# Patient Record
Sex: FEMALE | Race: ASIAN | Employment: FULL TIME | URBAN - METROPOLITAN AREA
[De-identification: names, ages, dates, MRNs, and addresses within clinical notes are randomized per-mention and may not be internally consistent; named-entity substitution may affect disease eponyms.]

---

## 2017-01-31 ENCOUNTER — OFFICE VISIT (OUTPATIENT)
Dept: FAMILY MEDICINE | Facility: CLINIC | Age: 37
End: 2017-01-31

## 2017-01-31 VITALS
TEMPERATURE: 98.4 F | HEIGHT: 63 IN | SYSTOLIC BLOOD PRESSURE: 130 MMHG | BODY MASS INDEX: 34.41 KG/M2 | WEIGHT: 194.2 LBS | RESPIRATION RATE: 20 BRPM | DIASTOLIC BLOOD PRESSURE: 79 MMHG | OXYGEN SATURATION: 96 % | HEART RATE: 70 BPM

## 2017-01-31 DIAGNOSIS — Z23 IMMUNIZATION DUE: ICD-10-CM

## 2017-01-31 DIAGNOSIS — Z30.018 ENCOUNTER FOR INITIAL PRESCRIPTION OF OTHER CONTRACEPTIVES: ICD-10-CM

## 2017-01-31 DIAGNOSIS — B37.31 YEAST INFECTION OF THE VAGINA: Primary | ICD-10-CM

## 2017-01-31 DIAGNOSIS — N89.8 VAGINAL ITCHING: ICD-10-CM

## 2017-01-31 RX ORDER — NORGESTIMATE AND ETHINYL ESTRADIOL 0.25-0.035
1 KIT ORAL DAILY
Qty: 84 TABLET | Refills: 3 | Status: SHIPPED | OUTPATIENT
Start: 2017-01-31 | End: 2018-03-26

## 2017-01-31 RX ORDER — FLUCONAZOLE 150 MG/1
150 TABLET ORAL ONCE
Qty: 1 TABLET | Refills: 1 | Status: SHIPPED | OUTPATIENT
Start: 2017-01-31 | End: 2017-01-31

## 2017-01-31 NOTE — NURSING NOTE
1/31/2017 PCS Previsit Plan     DUE FOR:  Flu Shot - Declined   Tdap - Pending, would like to speak with MD.     LABS DUE:    JOHN La CMA

## 2017-01-31 NOTE — MR AVS SNAPSHOT
After Visit Summary   1/31/2017    Minnie Garg    MRN: [id number]           Patient Information     Date Of Birth          1980        Visit Information        Provider Department      1/31/2017 8:20 AM Amy Bhagat MD [facility]        Today's Diagnoses     Yeast infection of the vagina    -  1     Vaginal itching         Encounter for initial prescription of other contraceptives           Care Instructions    - You will want to start your birth control today  - Make sure to use condoms  -     Your medication list is printed, please keep this with you, it is helpful to bring this current list to any other medical appointments, the emergency room or hospital.    If you had lab testing today and your results are reassuring or normal they will be be mailed to you within 7 days.     If the lab tests need quick action we will call you with the results.  The phone number we will call with results is # [phone] (home) . If this is not the best number please call our clinic and change the number.    If you need any refills please call your pharmacy and they will contact us.    If you have any further concerns or wish to schedule another appointment you must call our office during normal business hours  [phone] (8-5:00 M-F)  If you have urgent medical questions that cannot wait  you may also call [phone] at any time of day.  If you have a medical emergency please call [phone].    Thank you for coming to [facility].    Etonogestrel Implant  What is this medicine?  ETONOGESTREL (et oh jose BETO trel) is a contraceptive (birth control) device. It is used to prevent pregnancy. It can be used for up to 3 years.  This medicine may be used for other purposes; ask your health care provider or pharmacist if you have questions.  What should I tell my health care provider before I take this medicine?  They need to know if you have any of these conditions:    abnormal  vaginal bleeding    blood vessel disease or blood clots    cancer of the breast, cervix, or liver    depression    diabetes    gallbladder disease    headaches    heart disease or recent heart attack    high blood pressure    high cholesterol    kidney disease    liver disease    renal disease    seizures    tobacco smoker    an unusual or allergic reaction to etonogestrel, other hormones, anesthetics or antiseptics, medicines, foods, dyes, or preservatives    pregnant or trying to get pregnant    breast-feeding  How should I use this medicine?  This device is inserted just under the skin on the inner side of your upper arm by a health care professional.  Talk to your pediatrician regarding the use of this medicine in children. Special care may be needed.  Overdosage: If you think you've taken too much of this medicine contact a poison control center or emergency room at once.  NOTE: This medicine is only for you. Do not share this medicine with others.  What if I miss a dose?  This does not apply.  What may interact with this medicine?  Do not take this medicine with any of the following medications:    amprenavir    bosentan    fosamprenavir  This medicine may also interact with the following medications:    barbiturate medicines for inducing sleep or treating seizures    certain medicines for fungal infections like ketoconazole and itraconazole    griseofulvin    medicines to treat seizures like carbamazepine, felbamate, oxcarbazepine, phenytoin, topiramate    modafinil    phenylbutazone    rifampin    some medicines to treat HIV infection like atazanavir, indinavir, lopinavir, nelfinavir, tipranavir, ritonavir    [place]'s wort  This list may not describe all possible interactions. Give your health care provider a list of all the medicines, herbs, non-prescription drugs, or dietary supplements you use. Also tell them if you smoke, drink alcohol, or use illegal drugs. Some items may interact with your  medicine.  What should I watch for while using this medicine?  This product does not protect you against HIV infection (AIDS) or other sexually transmitted diseases.  You should be able to feel the implant by pressing your fingertips over the skin where it was inserted. Contact your doctor if you cannot feel the implant, and use a non-hormonal birth control method (such as condoms) until your doctor confirms that the implant is in place. If you feel that the implant may have broken or become bent while in your arm, contact your healthcare provider.  What side effects may I notice from receiving this medicine?  Side effects that you should report to your doctor or health care professional as soon as possible:    allergic reactions like skin rash, itching or hives, swelling of the face, lips, or tongue    breast lumps    changes in emotions or moods    depressed mood    heavy or prolonged menstrual bleeding    pain, irritation, swelling, or bruising at the insertion site    scar at site of insertion    signs of infection at the insertion site such as fever, and skin redness, pain or discharge    signs of pregnancy    signs and symptoms of a blood clot such as breathing problems; changes in vision; chest pain; severe, sudden headache; pain, swelling, warmth in the leg; trouble speaking; sudden numbness or weakness of the face, arm or leg    signs and symptoms of liver injury like dark yellow or brown urine; general ill feeling or flu-like symptoms; light-colored stools; loss of appetite; nausea; right upper belly pain; unusually weak or tired; yellowing of the eyes or skin    unusual vaginal bleeding, discharge    signs and symptoms of a stroke like changes in vision; confusion; trouble speaking or understanding; severe headaches; sudden numbness or weakness of the face, arm or leg; trouble walking; dizziness; loss of balance or coordination  Side effects that usually do not require medical attention (Report these to  your doctor or health care professional if they continue or are bothersome.):    acne    back pain    breast pain    changes in weight    dizziness    general ill feeling or flu-like symptoms    headache    irregular menstrual bleeding    nausea    sore throat    vaginal irritation or inflammation  This list may not describe all possible side effects. Call your doctor for medical advice about side effects. You may report side effects to FDA at 9-581-HXX-3069.  Where should I keep my medicine?  This drug is given in a hospital or clinic and will not be stored at home.  NOTE: This sheet is a summary. It may not cover all possible information. If you have questions about this medicine, talk to your doctor, pharmacist, or health care provider.  NOTE:This sheet is a summary. It may not cover all possible information. If you have questions about this medicine, talk to your doctor, pharmacist, or health care provider. Copyright  2016 Gold Standard              Follow-ups after your visit        Who to contact     Please call your clinic at [phone] to:    Ask questions about your health    Make or cancel appointments    Discuss your medicines    Learn about your test results    Speak to your doctor   If you have compliments or concerns about an experience at your clinic, or if you wish to file a complaint, please contact [facility] Patient Relations at [phone] or email us at [email or web address]         Additional Information About Your Visit        [employer] Information     [employer] gives you secure access to your electronic health record. If you see a primary care provider, you can also send messages to your care team and make appointments. If you have questions, please call your primary care clinic.  If you do not have a primary care provider, please call [phone] and they will assist you.      [employer] is an electronic gateway that provides easy, online access to your  "medical records. With [employer], you can request a clinic appointment, read your test results, renew a prescription or communicate with your care team.     To access your existing account, please contact your [facility] or call [phone] for assistance.        Care EveryWhere ID     This is your Care EveryWhere ID. This could be used by other organizations to access your [facility] medical records  [id number]        Your Vitals Were     Pulse Temperature Respirations Height BMI (Body Mass Index) Pulse Oximetry    70 98.4  F (36.9  C) (Oral) 20 5' 2.5\" (158.8 cm) 34.93 kg/m2 96%    Last Period                   01/24/2017            Blood Pressure from Last 3 Encounters:   01/31/17 130/79   11/04/16 112/74   09/08/16 126/81    Weight from Last 3 Encounters:   01/31/17 194 lb 3.2 oz (88.089 kg)   11/04/16 197 lb 6.4 oz (89.54 kg)   09/08/16 198 lb 3.2 oz (89.903 kg)              Today, you had the following     No orders found for display         Today's Medication Changes          These changes are accurate as of: 1/31/17  8:40 AM.  If you have any questions, ask your nurse or doctor.               Start taking these medicines.        Dose/Directions    fluconazole 150 MG tablet   Commonly known as:  DIFLUCAN   Used for:  Yeast infection of the vagina, Vaginal itching   Started by:  Amy Bhagat MD        Dose:  150 mg   Take 1 tablet (150 mg) by mouth once for 1 dose   Quantity:  1 tablet   Refills:  1       norgestimate-ethinyl estradiol 0.25-35 MG-MCG per tablet   Commonly known as:  ORTHO-CYCLEN, SPRINTEC   Used for:  Encounter for initial prescription of other contraceptives   Started by:  Amy Bhagat MD        Dose:  1 tablet   Take 1 tablet by mouth daily   Quantity:  84 tablet   Refills:  3            Where to get your medicines      These medications were sent to [facility] - [place], [place] - [address]  [address], " [place] [address]     Phone:  [phone]    - fluconazole 150 MG tablet  - norgestimate-ethinyl estradiol 0.25-35 MG-MCG per tablet             Primary Care Provider Office Phone # Fax #    Makeda IBARRA DemianSvenHANS Dorado PADMINI [phone]       [facility] [address] [place] [address]        Thank you!     Thank you for choosing [facility]  for your care. Our goal is always to provide you with excellent care. Hearing back from our patients is one way we can continue to improve our services. Please take a few minutes to complete the written survey that you may receive in the mail after your visit with us. Thank you!             Your Updated Medication List - Protect others around you: Learn how to safely use, store and throw away your medicines at www.disposemymeds.org.          This list is accurate as of: 1/31/17  8:40 AM.  Always use your most recent med list.                   Brand Name Dispense Instructions for use    acetaminophen 325 MG tablet    TYLENOL     Take 650 mg by mouth       FLEXERIL 10 MG tablet   Generic drug:  cyclobenzaprine     14 tablet    Take 1 tablet (10 mg) by mouth nightly as needed (pain at HS)       fluconazole 150 MG tablet    DIFLUCAN    1 tablet    Take 1 tablet (150 mg) by mouth once for 1 dose       norgestimate-ethinyl estradiol 0.25-35 MG-MCG per tablet    ORTHO-CYCLEN, SPRINTEC    84 tablet    Take 1 tablet by mouth daily       PLAQUENIL 200 MG tablet   Generic drug:  hydroxychloroquine     60 tablet    Take 1 tablet (200 mg) by mouth 2 times daily

## 2017-01-31 NOTE — PATIENT INSTRUCTIONS
- You will want to start your birth control today  - Make sure to use condoms  -     Your medication list is printed, please keep this with you, it is helpful to bring this current list to any other medical appointments, the emergency room or hospital.    If you had lab testing today and your results are reassuring or normal they will be be mailed to you within 7 days.     If the lab tests need quick action we will call you with the results.  The phone number we will call with results is # [phone] (home) . If this is not the best number please call our clinic and change the number.    If you need any refills please call your pharmacy and they will contact us.    If you have any further concerns or wish to schedule another appointment you must call our office during normal business hours  [phone] (8-5:00 M-F)  If you have urgent medical questions that cannot wait  you may also call [phone] at any time of day.  If you have a medical emergency please call 911.    Thank you for coming to [facility].    Etonogestrel Implant  What is this medicine?  ETONOGESTREL (et oh jose BETO trel) is a contraceptive (birth control) device. It is used to prevent pregnancy. It can be used for up to 3 years.  This medicine may be used for other purposes; ask your health care provider or pharmacist if you have questions.  What should I tell my health care provider before I take this medicine?  They need to know if you have any of these conditions:    abnormal vaginal bleeding    blood vessel disease or blood clots    cancer of the breast, cervix, or liver    depression    diabetes    gallbladder disease    headaches    heart disease or recent heart attack    high blood pressure    high cholesterol    kidney disease    liver disease    renal disease    seizures    tobacco smoker    an unusual or allergic reaction to etonogestrel, other hormones, anesthetics or antiseptics, medicines, foods, dyes, or  preservatives    pregnant or trying to get pregnant    breast-feeding  How should I use this medicine?  This device is inserted just under the skin on the inner side of your upper arm by a health care professional.  Talk to your pediatrician regarding the use of this medicine in children. Special care may be needed.  Overdosage: If you think you've taken too much of this medicine contact a poison control center or emergency room at once.  NOTE: This medicine is only for you. Do not share this medicine with others.  What if I miss a dose?  This does not apply.  What may interact with this medicine?  Do not take this medicine with any of the following medications:    amprenavir    bosentan    fosamprenavir  This medicine may also interact with the following medications:    barbiturate medicines for inducing sleep or treating seizures    certain medicines for fungal infections like ketoconazole and itraconazole    griseofulvin    medicines to treat seizures like carbamazepine, felbamate, oxcarbazepine, phenytoin, topiramate    modafinil    phenylbutazone    rifampin    some medicines to treat HIV infection like atazanavir, indinavir, lopinavir, nelfinavir, tipranavir, ritonavir    [place]'s wort  This list may not describe all possible interactions. Give your health care provider a list of all the medicines, herbs, non-prescription drugs, or dietary supplements you use. Also tell them if you smoke, drink alcohol, or use illegal drugs. Some items may interact with your medicine.  What should I watch for while using this medicine?  This product does not protect you against HIV infection (AIDS) or other sexually transmitted diseases.  You should be able to feel the implant by pressing your fingertips over the skin where it was inserted. Contact your doctor if you cannot feel the implant, and use a non-hormonal birth control method (such as condoms) until your doctor confirms that the implant is in place. If you feel that  the implant may have broken or become bent while in your arm, contact your healthcare provider.  What side effects may I notice from receiving this medicine?  Side effects that you should report to your doctor or health care professional as soon as possible:    allergic reactions like skin rash, itching or hives, swelling of the face, lips, or tongue    breast lumps    changes in emotions or moods    depressed mood    heavy or prolonged menstrual bleeding    pain, irritation, swelling, or bruising at the insertion site    scar at site of insertion    signs of infection at the insertion site such as fever, and skin redness, pain or discharge    signs of pregnancy    signs and symptoms of a blood clot such as breathing problems; changes in vision; chest pain; severe, sudden headache; pain, swelling, warmth in the leg; trouble speaking; sudden numbness or weakness of the face, arm or leg    signs and symptoms of liver injury like dark yellow or brown urine; general ill feeling or flu-like symptoms; light-colored stools; loss of appetite; nausea; right upper belly pain; unusually weak or tired; yellowing of the eyes or skin    unusual vaginal bleeding, discharge    signs and symptoms of a stroke like changes in vision; confusion; trouble speaking or understanding; severe headaches; sudden numbness or weakness of the face, arm or leg; trouble walking; dizziness; loss of balance or coordination  Side effects that usually do not require medical attention (Report these to your doctor or health care professional if they continue or are bothersome.):    acne    back pain    breast pain    changes in weight    dizziness    general ill feeling or flu-like symptoms    headache    irregular menstrual bleeding    nausea    sore throat    vaginal irritation or inflammation  This list may not describe all possible side effects. Call your doctor for medical advice about side effects. You may report side effects to FDA at  1-800-FDA-1088.  Where should I keep my medicine?  This drug is given in a hospital or clinic and will not be stored at home.  NOTE: This sheet is a summary. It may not cover all possible information. If you have questions about this medicine, talk to your doctor, pharmacist, or health care provider.  NOTE:This sheet is a summary. It may not cover all possible information. If you have questions about this medicine, talk to your doctor, pharmacist, or health care provider. Copyright  2016 Gold Standard

## 2017-01-31 NOTE — PROGRESS NOTES
"       HPI:       Minnie Garg is a healthy 36 year old  female who presents to address the following concerns:    Desire for contraception:  Patient sexaully active with one male partner.  No hx contraceptive use.  No previous conception.  Would like OCPs to prevent pregnancy.  No bleeding or clotting disorders, no smoking, no family hx bleed/ing/clotting.      Current Lupus:   Pateint with historic dx Lupus  Controlled on [facility]  No concerns about control    Travel:  Traveling to [facility] and [place] for 2 weeks  Is hoping for rx Fluconazole for yeast infection if she gets one.     Social alcohol use.                PMHX:     Patient Active Problem List   Diagnosis     Systemic lupus erythematosus (H)     Anemia, unspecified type     Non morbid obesity due to excess calories     Pap smear for cervical cancer screening       Current Outpatient Prescriptions   Medication Sig Dispense Refill     cyclobenzaprine (FLEXERIL) 10 MG tablet Take 1 tablet (10 mg) by mouth nightly as needed (pain at HS) 14 tablet 0     hydroxychloroquine (PLAQUENIL) 200 MG tablet Take 1 tablet (200 mg) by mouth 2 times daily 60 tablet 1     acetaminophen (TYLENOL) 325 MG tablet Take 650 mg by mouth            Allergies   Allergen Reactions     No Known Allergies        No results found for this or any previous visit (from the past 24 hour(s)).    Current Outpatient Prescriptions   Medication     cyclobenzaprine (FLEXERIL) 10 MG tablet     hydroxychloroquine (PLAQUENIL) 200 MG tablet     acetaminophen (TYLENOL) 325 MG tablet     No current facility-administered medications for this visit.              Review of Systems:   ROS as described above.  Denies F/S/C/N/V/SOB/CP          Physical Exam:     Filed Vitals:    01/31/17 0814   BP: 130/79   Pulse: 70   Temp: 98.4  F (36.9  C)   TempSrc: Oral   Resp: 20   Height: 5' 2.5\" (158.8 cm)   Weight: 194 lb 3.2 oz (88.089 kg)   SpO2: 96% "     Body mass index is 34.93 kg/(m^2).    GEN: patient sitting comfortably in NAD  HEEN: Head is atraumatic, normocephalic, eyes anicteric, mucous membranes moist  CV: RRR w/o M/R/G  PULM: CTAB without w/r/r  ABD: soft, nontender, bowel sounds present  NEURO: Alert and oriented x3.  No focal motor abnormalities.  Face symmetric.  PSYCH: appropriate  SKIN: No rashes, bruising, or other lesions        Assessment and Plan     1. Yeast infection of the vagina  -refill for use on trip  - fluconazole (DIFLUCAN) 150 MG tablet; Take 1 tablet (150 mg) by mouth once for 1 dose  Dispense: 1 tablet; Refill: 1      2. Encounter for initial prescription of other contraceptives  -discussed efficacy of various contraceptive choices.  No interaction found OCP and plaquenil  -patient will consider Nexplanon for the future  - norgestimate-ethinyl estradiol (ORTHO-CYCLEN, SPRINTEC) 0.25-35 MG-MCG per tablet; Take 1 tablet by mouth daily  Dispense: 84 tablet; Refill: 3    3. Lupus: well controlled per patient report.  Not due for refill today of Plaquenil    Options for treatment and follow-up care were reviewed with the patient and/or guardian. Minnie Her and/or guardian engaged in the decision making process and verbalized understanding of the options discussed and agreed with the final plan.    4. HCM: Tdap today    Amy Bhagat MD

## 2017-03-24 ENCOUNTER — OFFICE VISIT (OUTPATIENT)
Dept: FAMILY MEDICINE | Facility: CLINIC | Age: 37
End: 2017-03-24

## 2017-03-24 VITALS
HEART RATE: 108 BPM | WEIGHT: 194 LBS | TEMPERATURE: 98.7 F | DIASTOLIC BLOOD PRESSURE: 74 MMHG | SYSTOLIC BLOOD PRESSURE: 116 MMHG | HEIGHT: 63 IN | OXYGEN SATURATION: 97 % | BODY MASS INDEX: 34.38 KG/M2

## 2017-03-24 DIAGNOSIS — J06.9 URI (UPPER RESPIRATORY INFECTION): ICD-10-CM

## 2017-03-24 DIAGNOSIS — R07.0 THROAT PAIN: Primary | ICD-10-CM

## 2017-03-24 LAB — S PYO AG THROAT QL IA.RAPID: NEGATIVE

## 2017-03-24 RX ORDER — IBUPROFEN 400 MG/1
400 TABLET, FILM COATED ORAL EVERY 6 HOURS PRN
Qty: 30 TABLET | Refills: 0 | Status: SHIPPED | OUTPATIENT
Start: 2017-03-24 | End: 2018-04-30

## 2017-03-24 NOTE — MR AVS SNAPSHOT
After Visit Summary   3/24/2017    Minnie Garg    MRN: [id number]           Patient Information     Date Of Birth          1980        Visit Information        Provider Department      3/24/2017 10:20 AM Makeda Santana APRN CNP [facility]        Today's Diagnoses     Throat pain    -  1    URI (upper respiratory infection)          Care Instructions      Viral Upper Respiratory Illness (Adult)  You have a viral upper respiratory illness (URI), which is another term for the common cold. This illness is contagious during the first few days. It is spread through the air by coughing and sneezing. It may also be spread by direct contact (touching the sick person and then touching your own eyes, nose, or mouth). Frequent handwashing will decrease risk of spread. Most viral illnesses go away within 7 to 10 days with rest and simple home remedies. Sometimes the illness may last for several weeks. Antibiotics will not kill a virus, and they are generally not prescribed for this condition.    Home care    If symptoms are severe, rest at home for the first 2 to 3 days. When you resume activity, don't let yourself get too tired.    Avoid being exposed to cigarette smoke (yours or others ).    You may use acetaminophen or ibuprofen to control pain and fever, unless another medicine was prescribed. (Note: If you have chronic liver or kidney disease, have ever had a stomach ulcer or gastrointestinal bleeding, or are taking blood-thinning medicines, talk with your healthcare provider before using these medicines.) Aspirin should never be given to anyone under 18 years of age who is ill with a viral infection or fever. It may cause severe liver or brain damage.    Your appetite may be poor, so a light diet is fine. Avoid dehydration by drinking 6 to 8 glasses of fluids per day (water, soft drinks, juices, tea, or soup). Extra fluids will help loosen secretions in the nose and  lungs.    Over-the-counter cold medicines will not shorten the length of time you re sick, but they may be helpful for the following symptoms: cough, sore throat, and nasal and sinus congestion. (Note: Do not use decongestants if you have high blood pressure.)  Follow-up care  Follow up with your healthcare provider, or as advised.  When to seek medical advice  Call your healthcare provider right away if any of these occur:    Cough with lots of colored sputum (mucus)    Severe headache; face, neck, or ear pain    Difficulty swallowing due to throat pain    Fever of 100.4 F (38 C)  Call 911, or get immediate medical care  Call emergency services right away if any of these occur:    Chest pain, shortness of breath, wheezing, or difficulty breathing    Coughing up blood    Inability to swallow due to throat pain    9635-6324 [employer]. [address] [place] [address]. All rights reserved. This information is not intended as a substitute for professional medical care. Always follow your healthcare professional's instructions.        When You Have a Sore Throat  A sore throat can be painful. There are many reasons why you may have a sore throat. Your healthcare provider will work with you to find the cause of your sore throat. He or she will also find the best treatment for you.      What Causes a Sore Throat?  Sore throats can be caused or worsened by:    Cold or flu viruses    Bacteria    Irritants such as tobacco smoke    Acid reflux  A Healthy Throat  The tonsils are on the sides of the throat near the base of the tongue. They collect viruses and bacteria and help fight infection. The throat (pharynx) is the passage for air. Mucus from the nasal cavity also moves down the passage.  An Inflamed Throat  The tonsils and pharynx can become inflamed due to a cold or flu virus. Postnasal drip (excess mucus draining from the nasal cavity) can irritate the throat. It can also make the throat or  tonsils more likely to be infected by bacteria. Severe, untreated tonsillitis in children or adults can cause a pocket of pus (abscess) to form near the tonsil.  Your Evaluation  A medical evaluation can help find the cause of your sore throat. It can also help your healthcare provider choose the best treatment for you. The evaluation may include a health history, physical exam, and diagnostic tests.  Health History  Your healthcare provider may ask you the following:    How long has the sore throat lasted and how have you been treating it?    Do you have any other symptoms, such as body aches, fever, or cough?    Does your sore throat recur? If so, how often? How many days of school or work have you missed because of a sore throat?    Do you have trouble eating or swallowing?    Have you been told that you snore or have other sleep problems?    Do you have bad breath?    Do you cough up bad-tasting mucus?  Physical Exam  During the exam, your healthcare provider checks your ears, nose, and throat for problems. He or she also checks for swelling in the neck, and may listen to your chest.  Possible Tests  Other tests your healthcare provider may perform include:    A throat swab to check for bacteria such as streptococcus (the bacteria that causes strep throat)    A blood test to check for mononucleosis (a viral infection)    A chest x-ray to rule out pneumonia, especially if you have a cough  Treating a Sore Throat  Treatment depends on many factors. What is the likely cause? Is the problem recent? Does it keep coming back? In many cases, the best thing to do is to treat the symptoms, rest, and let the problem heal itself. Antibiotics may help clear up some infections. For cases of severe or recurring tonsillitis, the tonsils may need to be removed.  Relieving Your Symptoms    Don t smoke, and avoid secondhand smoke.    For children, try throat sprays or Popsicles. Adults and older children may try  lozenges.    Drink warm liquids to soothe the throat and help thin mucus. Avoid alcohol, spicy foods, and acidic drinks such as orange juice. These can irritate the throat.    Gargle with warm saltwater (1 teaspoon of salt to 8 ounces of warm water).    Use a humidifier to keep air moist and relieve throat dryness.    Try over-the-counter pain relievers such as acetaminophen or ibuprofen. Use as directed, and don t exceed the recommended dose. Don t give aspirin to children.   Are Antibiotics Needed?  If your sore throat is due to a bacterial infection, antibiotics may speed healing and prevent complications. But most sore throats are caused by cold or flu viruses. And antibiotics don t treat viral illness. In fact, using antibiotics when they re not needed may produce bacteria that are harder to kill. Your healthcare provider will prescribe antibiotics only if he or she thinks they are likely to help.  If Antibiotics Are Prescribed  Take the medication exactly as directed. Be sure to finish your prescription even if you re feeling better.  And be sure to ask your healthcare provider or pharmacist what side effects are common and what to do about them.  Is Surgery Needed?  In some cases, tonsils need to be removed. This is often done as outpatient (same-day) surgery. Your healthcare provider may advise removing the tonsils in cases of:    Several severe bouts of tonsillitis in a year.  Severe  episodes include those that lead to missed days of school or work, or that need to be treated with antibiotics.    Tonsillitis that causes breathing problems during sleep.    Tonsillitis caused by food particles collecting in pouches in the tonsils (cryptic tonsillitis).  Call your healthcare provider if any of the following occur:    Symptoms worsen, or new symptoms develop.    Swollen tonsils make breathing difficult.    The pain is severe enough to keep you from drinking liquids.    A skin rash, hives, or wheezing develops.  Any of these could signal an allergic reaction to antibiotics.    Symptoms don t improve within a week.    Symptoms don t improve within 2-3 days of starting antibiotics.     1814-1086 [employer]. [address] [place] [address], [place] [address]. All rights reserved. This information is not intended as a substitute for professional medical care. Always follow your healthcare professional's instructions.              Follow-ups after your visit        Who to contact     Please call your clinic at [phone] to:    Ask questions about your health    Make or cancel appointments    Discuss your medicines    Learn about your test results    Speak to your doctor   If you have compliments or concerns about an experience at your clinic, or if you wish to file a complaint, please contact [facility] Patient Relations at [phone] or email us at [email or web address]         Additional Information About Your Visit        [employer] Information     [employer] gives you secure access to your electronic health record. If you see a primary care provider, you can also send messages to your care team and make appointments. If you have questions, please call your primary care clinic.  If you do not have a primary care provider, please call [phone] and they will assist you.      [employer] is an electronic gateway that provides easy, online access to your medical records. With [employer], you can request a clinic appointment, read your test results, renew a prescription or communicate with your care team.     To access your existing account, please contact your [facility] Clinic or call [phone] for assistance.        Care EveryWhere ID     This is your Care EveryWhere ID. This could be used by other organizations to access your [facility] medical records  [id number]        Your Vitals Were     Pulse Temperature Height Pulse Oximetry BMI (Body Mass Index)       108  "98.7  F (37.1  C) (Oral) 5' 2.75\" (159.4 cm) 97% 34.64 kg/m2        Blood Pressure from Last 3 Encounters:   03/24/17 116/74   01/31/17 130/79   11/04/16 112/74    Weight from Last 3 Encounters:   03/24/17 194 lb (88 kg)   01/31/17 194 lb 3.2 oz (88.1 kg)   11/04/16 197 lb 6.4 oz (89.5 kg)              We Performed the Following     Rapid Strep Screen (Group) ([facility])          Today's Medication Changes          These changes are accurate as of: 3/24/17 11:52 AM.  If you have any questions, ask your nurse or doctor.               Start taking these medicines.        Dose/Directions    ibuprofen 400 MG tablet   Commonly known as:  ADVIL/MOTRIN   Used for:  URI (upper respiratory infection)   Started by:  Makeda Santana APRN CNP        Dose:  400 mg   Take 1 tablet (400 mg) by mouth every 6 hours as needed for moderate pain   Quantity:  30 tablet   Refills:  0       lidocaine 2 % solution   Commonly known as:  XYLOCAINE   Used for:  Throat pain   Started by:  Makeda Santana APRN CNP        swish and spit 10 ml every 3 hours as needed max 8 doses/24 hour period   Quantity:  100 mL   Refills:  0            Where to get your medicines      These medications were sent to [facility] - [place], [place] - [address]  [address], [place] [address]     Phone:  [phone]     ibuprofen 400 MG tablet    lidocaine 2 % solution                Primary Care Provider Office Phone # Fax #    HANS Majano [phone]       [facility] [address] [place] [address]        Thank you!     Thank you for choosing [facility]  for your care. Our goal is always to provide you with excellent care. Hearing back from our patients is one way we can continue to improve our services. Please take a few minutes to complete the written survey that you may receive in the mail after your visit with us. Thank you!             Your Updated " Medication List - Protect others around you: Learn how to safely use, store and throw away your medicines at www.disposemymeds.org.          This list is accurate as of: 3/24/17 11:52 AM.  Always use your most recent med list.                   Brand Name Dispense Instructions for use    acetaminophen 325 MG tablet    TYLENOL     Take 650 mg by mouth       FLEXERIL 10 MG tablet   Generic drug:  cyclobenzaprine     14 tablet    Take 1 tablet (10 mg) by mouth nightly as needed (pain at HS)       ibuprofen 400 MG tablet    ADVIL/MOTRIN    30 tablet    Take 1 tablet (400 mg) by mouth every 6 hours as needed for moderate pain       lidocaine 2 % solution    XYLOCAINE    100 mL    swish and spit 10 ml every 3 hours as needed max 8 doses/24 hour period       norgestimate-ethinyl estradiol 0.25-35 MG-MCG per tablet    ORTHO-CYCLEN, SPRINTEC    84 tablet    Take 1 tablet by mouth daily       PLAQUENIL 200 MG tablet   Generic drug:  hydroxychloroquine     60 tablet    Take 1 tablet (200 mg) by mouth 2 times daily

## 2017-03-24 NOTE — PROGRESS NOTES
"  SUBJECTIVE: 36 year old female presents with 2 day hx sore throat with cough.  Began coughing last night, dry cough mostly and today has chills and sore throat.  No muscle aches, or fever measured.     Past Medical History:   Diagnosis Date     Lupus (H)      Sees [facility]umatologist, [facility]. Unable to get in prior to trip [place] in [place], which went well without any illness.    OBJECTIVE: /74 (BP Location: Right arm, Patient Position: Chair, Cuff Size: Adult Regular)  Pulse 108  Temp 98.7  F (37.1  C) (Oral)  Ht 5' 2.75\" (159.4 cm)  Wt 194 lb (88 kg)  SpO2 97%  BMI 34.64 kg/m2      ASSESSMENT: ***    PLAN: ***    "

## 2017-03-24 NOTE — PATIENT INSTRUCTIONS
Viral Upper Respiratory Illness (Adult)  You have a viral upper respiratory illness (URI), which is another term for the common cold. This illness is contagious during the first few days. It is spread through the air by coughing and sneezing. It may also be spread by direct contact (touching the sick person and then touching your own eyes, nose, or mouth). Frequent handwashing will decrease risk of spread. Most viral illnesses go away within 7 to 10 days with rest and simple home remedies. Sometimes the illness may last for several weeks. Antibiotics will not kill a virus, and they are generally not prescribed for this condition.    Home care    If symptoms are severe, rest at home for the first 2 to 3 days. When you resume activity, don't let yourself get too tired.    Avoid being exposed to cigarette smoke (yours or others ).    You may use acetaminophen or ibuprofen to control pain and fever, unless another medicine was prescribed. (Note: If you have chronic liver or kidney disease, have ever had a stomach ulcer or gastrointestinal bleeding, or are taking blood-thinning medicines, talk with your healthcare provider before using these medicines.) Aspirin should never be given to anyone under 18 years of age who is ill with a viral infection or fever. It may cause severe liver or brain damage.    Your appetite may be poor, so a light diet is fine. Avoid dehydration by drinking 6 to 8 glasses of fluids per day (water, soft drinks, juices, tea, or soup). Extra fluids will help loosen secretions in the nose and lungs.    Over-the-counter cold medicines will not shorten the length of time you re sick, but they may be helpful for the following symptoms: cough, sore throat, and nasal and sinus congestion. (Note: Do not use decongestants if you have high blood pressure.)  Follow-up care  Follow up with your healthcare provider, or as advised.  When to seek medical advice  Call your healthcare provider right away if any  of these occur:    Cough with lots of colored sputum (mucus)    Severe headache; face, neck, or ear pain    Difficulty swallowing due to throat pain    Fever of 100.4 F (38 C)  Call 911, or get immediate medical care  Call emergency services right away if any of these occur:    Chest pain, shortness of breath, wheezing, or difficulty breathing    Coughing up blood    Inability to swallow due to throat pain    7760-8272 [employer]. [address] [place] [address], [place] [id number]. All rights reserved. This information is not intended as a substitute for professional medical care. Always follow your healthcare professional's instructions.        When You Have a Sore Throat  A sore throat can be painful. There are many reasons why you may have a sore throat. Your healthcare provider will work with you to find the cause of your sore throat. He or she will also find the best treatment for you.      What Causes a Sore Throat?  Sore throats can be caused or worsened by:    Cold or flu viruses    Bacteria    Irritants such as tobacco smoke    Acid reflux  A Healthy Throat  The tonsils are on the sides of the throat near the base of the tongue. They collect viruses and bacteria and help fight infection. The throat (pharynx) is the passage for air. Mucus from the nasal cavity also moves down the passage.  An Inflamed Throat  The tonsils and pharynx can become inflamed due to a cold or flu virus. Postnasal drip (excess mucus draining from the nasal cavity) can irritate the throat. It can also make the throat or tonsils more likely to be infected by bacteria. Severe, untreated tonsillitis in children or adults can cause a pocket of pus (abscess) to form near the tonsil.  Your Evaluation  A medical evaluation can help find the cause of your sore throat. It can also help your healthcare provider choose the best treatment for you. The evaluation may include a health history, physical exam, and diagnostic tests.  Health  History  Your healthcare provider may ask you the following:    How long has the sore throat lasted and how have you been treating it?    Do you have any other symptoms, such as body aches, fever, or cough?    Does your sore throat recur? If so, how often? How many days of school or work have you missed because of a sore throat?    Do you have trouble eating or swallowing?    Have you been told that you snore or have other sleep problems?    Do you have bad breath?    Do you cough up bad-tasting mucus?  Physical Exam  During the exam, your healthcare provider checks your ears, nose, and throat for problems. He or she also checks for swelling in the neck, and may listen to your chest.  Possible Tests  Other tests your healthcare provider may perform include:    A throat swab to check for bacteria such as streptococcus (the bacteria that causes strep throat)    A blood test to check for mononucleosis (a viral infection)    A chest x-ray to rule out pneumonia, especially if you have a cough  Treating a Sore Throat  Treatment depends on many factors. What is the likely cause? Is the problem recent? Does it keep coming back? In many cases, the best thing to do is to treat the symptoms, rest, and let the problem heal itself. Antibiotics may help clear up some infections. For cases of severe or recurring tonsillitis, the tonsils may need to be removed.  Relieving Your Symptoms    Don t smoke, and avoid secondhand smoke.    For children, try throat sprays or Popsicles. Adults and older children may try lozenges.    Drink warm liquids to soothe the throat and help thin mucus. Avoid alcohol, spicy foods, and acidic drinks such as orange juice. These can irritate the throat.    Gargle with warm saltwater (1 teaspoon of salt to 8 ounces of warm water).    Use a humidifier to keep air moist and relieve throat dryness.    Try over-the-counter pain relievers such as acetaminophen or ibuprofen. Use as directed, and don t exceed the  recommended dose. Don t give aspirin to children.   Are Antibiotics Needed?  If your sore throat is due to a bacterial infection, antibiotics may speed healing and prevent complications. But most sore throats are caused by cold or flu viruses. And antibiotics don t treat viral illness. In fact, using antibiotics when they re not needed may produce bacteria that are harder to kill. Your healthcare provider will prescribe antibiotics only if he or she thinks they are likely to help.  If Antibiotics Are Prescribed  Take the medication exactly as directed. Be sure to finish your prescription even if you re feeling better.  And be sure to ask your healthcare provider or pharmacist what side effects are common and what to do about them.  Is Surgery Needed?  In some cases, tonsils need to be removed. This is often done as outpatient (same-day) surgery. Your healthcare provider may advise removing the tonsils in cases of:    Several severe bouts of tonsillitis in a year.  Severe  episodes include those that lead to missed days of school or work, or that need to be treated with antibiotics.    Tonsillitis that causes breathing problems during sleep.    Tonsillitis caused by food particles collecting in pouches in the tonsils (cryptic tonsillitis).  Call your healthcare provider if any of the following occur:    Symptoms worsen, or new symptoms develop.    Swollen tonsils make breathing difficult.    The pain is severe enough to keep you from drinking liquids.    A skin rash, hives, or wheezing develops. Any of these could signal an allergic reaction to antibiotics.    Symptoms don t improve within a week.    Symptoms don t improve within 2-3 days of starting antibiotics.     9064-5825 [employer]. [address] [place] [address], [place] [address]. All rights reserved. This information is not intended as a substitute for professional medical care. Always follow your healthcare professional's instructions.

## 2017-03-28 ENCOUNTER — OFFICE VISIT (OUTPATIENT)
Dept: FAMILY MEDICINE | Facility: CLINIC | Age: 37
End: 2017-03-28

## 2017-03-28 VITALS
TEMPERATURE: 98.9 F | BODY MASS INDEX: 34.45 KG/M2 | DIASTOLIC BLOOD PRESSURE: 80 MMHG | SYSTOLIC BLOOD PRESSURE: 137 MMHG | WEIGHT: 194.4 LBS | HEIGHT: 63 IN | OXYGEN SATURATION: 96 % | HEART RATE: 73 BPM

## 2017-03-28 DIAGNOSIS — B00.9: ICD-10-CM

## 2017-03-28 DIAGNOSIS — J03.80: ICD-10-CM

## 2017-03-28 DIAGNOSIS — B00.1 HERPES LABIALIS: Primary | ICD-10-CM

## 2017-03-28 RX ORDER — PREDNISONE 20 MG/1
20 TABLET ORAL DAILY
Qty: 2 TABLET | Refills: 0 | Status: SHIPPED | OUTPATIENT
Start: 2017-03-28 | End: 2017-05-02

## 2017-03-28 RX ORDER — VALACYCLOVIR HYDROCHLORIDE 1 G/1
2000 TABLET, FILM COATED ORAL 2 TIMES DAILY
Qty: 4 TABLET | Refills: 0 | Status: SHIPPED | OUTPATIENT
Start: 2017-03-28 | End: 2017-03-31

## 2017-03-28 NOTE — PROGRESS NOTES
"S:  This is a previously healthy female presenting for acute illness.  Reports dry itchy throat, cold sores, cough beena at night, neck feels \"weird\" and mom put tiger balm on her.  No fevers.  Maybe some chills over the weekend.  Throat continues to be sore.  Has tried theraflu, robitussin, nyquil, sucrets, lidocaine, ibuprofen.  None of which have helped.      Cough started Thursday of last week.      Patient Active Problem List   Diagnosis     Systemic lupus erythematosus (H)     Anemia, unspecified type     Non morbid obesity due to excess calories     Pap smear for cervical cancer screening     Current Outpatient Prescriptions   Medication     predniSONE (DELTASONE) 20 MG tablet     benzonatate (TESSALON) 200 MG capsule     valACYclovir (VALTREX) 1000 mg tablet     guaiFENesin-codeine (ROBITUSSIN AC) 100-10 MG/5ML SOLN solution     ibuprofen (ADVIL/MOTRIN) 400 MG tablet     lidocaine (XYLOCAINE) 2 % solution     norgestimate-ethinyl estradiol (ORTHO-CYCLEN, SPRINTEC) 0.25-35 MG-MCG per tablet     acetaminophen (TYLENOL) 325 MG tablet     cyclobenzaprine (FLEXERIL) 10 MG tablet     hydroxychloroquine (PLAQUENIL) 200 MG tablet     No current facility-administered medications for this visit.      Allergies   Allergen Reactions     No Known Allergies      O:  /80 (BP Location: Right arm, Patient Position: Chair, Cuff Size: Adult Regular)  Pulse 73  Temp 98.9  F (37.2  C) (Oral)  Ht 5' 2.75\" (159.4 cm)  Wt 194 lb 6.4 oz (88.2 kg)  SpO2 96%  BMI 34.71 kg/m2  GEN: mildly ill appearing  HEENT: mild pharyngeal erythema without exudate  CV: rrr w/o M/R/G  PULM: CTAB  ABD: non-tender  SKIN: no rash  PSYCH: appropriate    Results for orders placed or performed in visit on 03/24/17   Rapid Strep Screen (Group) ([facility])   Result Value Ref Range    Rapid Strep A Screen NEGATIVE Negative     A/P:  1. Herpes labialis-acute.  New since onset of sx.  Will initiate 1x tx Valtrex    2. Acute tonsillitis due to herpes " simplex virus (HSV)-possibly 2/2 activation herpes? Some erythema without exudate and neg rapid strep.  Clinically stable  - predniSONE (DELTASONE) 20 MG tablet; Take 1 tablet (20 mg) by mouth daily  Dispense: 2 tablet; Refill: 0  -ibuprofen for pain PRN      Return to clinic if not improved in the next 3-5 days.

## 2017-03-28 NOTE — MR AVS SNAPSHOT
"              After Visit Summary   3/28/2017    Minnie Garg    MRN: [id number]           Patient Information     Date Of Birth          1980        Visit Information        Provider Department      3/28/2017 4:40 PM Amy Bhagat MD [facility]        Today's Diagnoses     Herpes labialis    -  1    Acute tonsillitis due to herpes simplex virus (HSV)           Follow-ups after your visit        Who to contact     Please call your clinic at [phone] to:    Ask questions about your health    Make or cancel appointments    Discuss your medicines    Learn about your test results    Speak to your doctor   If you have compliments or concerns about an experience at your clinic, or if you wish to file a complaint, please contact [facility] Patient Relations at [phone] or email us at [email or web address]         Additional Information About Your Visit        MyChart Information     [employer] gives you secure access to your electronic health record. If you see a primary care provider, you can also send messages to your care team and make appointments. If you have questions, please call your primary care clinic.  If you do not have a primary care provider, please call [phone] and they will assist you.      [employer] is an electronic gateway that provides easy, online access to your medical records. With [employer], you can request a clinic appointment, read your test results, renew a prescription or communicate with your care team.     To access your existing account, please contact your [facility] or call [phone] for assistance.        Care EveryWhere ID     This is your Care EveryWhere ID. This could be used by other organizations to access your [place] medical records  [id number]        Your Vitals Were     Pulse Temperature Height Pulse Oximetry BMI (Body Mass Index)       73 98.9  F (37.2  C) (Oral) 5' 2.75\" " (159.4 cm) 96% 34.71 kg/m2        Blood Pressure from Last 3 Encounters:   03/31/17 119/77   03/28/17 137/80   03/24/17 116/74    Weight from Last 3 Encounters:   03/31/17 195 lb (88.5 kg)   03/28/17 194 lb 6.4 oz (88.2 kg)   03/24/17 194 lb (88 kg)              Today, you had the following     No orders found for display         Today's Medication Changes          These changes are accurate as of: 3/28/17 11:59 PM.  If you have any questions, ask your nurse or doctor.               Start taking these medicines.        Dose/Directions    predniSONE 20 MG tablet   Commonly known as:  DELTASONE   Used for:  Acute tonsillitis due to herpes simplex virus (HSV)   Started by:  Amy Bhagat MD        Dose:  20 mg   Take 1 tablet (20 mg) by mouth daily   Quantity:  2 tablet   Refills:  0       valACYclovir 1000 mg tablet   Commonly known as:  VALTREX   Used for:  Herpes labialis   Started by:  Amy Bhagat MD        Dose:  2000 mg   Take 2 tablets (2,000 mg) by mouth 2 times daily   Quantity:  4 tablet   Refills:  0            Where to get your medicines      These medications were sent to [facility] - [place], [place] - [address] [place] [address]  [address] [facility] [address], [place] [address]     Phone:  [phone]     predniSONE 20 MG tablet    valACYclovir 1000 mg tablet                Primary Care Provider Office Phone # Fax #    HANS Majano [facility] [phone]       [facility] [address] [place] [address]        Thank you!     Thank you for choosing [facility]  for your care. Our goal is always to provide you with excellent care. Hearing back from our patients is one way we can continue to improve our services. Please take a few minutes to complete the written survey that you may receive in the mail after your visit with us. Thank you!             Your Updated Medication List - Protect others around you: Learn how to safely use,  store and throw away your medicines at www.disposemymeds.org.          This list is accurate as of: 3/28/17 11:59 PM.  Always use your most recent med list.                   Brand Name Dispense Instructions for use    acetaminophen 325 MG tablet    TYLENOL     Take 650 mg by mouth       FLEXERIL 10 MG tablet   Generic drug:  cyclobenzaprine     14 tablet    Take 1 tablet (10 mg) by mouth nightly as needed (pain at HS)       ibuprofen 400 MG tablet    ADVIL/MOTRIN    30 tablet    Take 1 tablet (400 mg) by mouth every 6 hours as needed for moderate pain       lidocaine 2 % solution    XYLOCAINE    100 mL    swish and spit 10 ml every 3 hours as needed max 8 doses/24 hour period       norgestimate-ethinyl estradiol 0.25-35 MG-MCG per tablet    ORTHO-CYCLEN, SPRINTEC    84 tablet    Take 1 tablet by mouth daily       PLAQUENIL 200 MG tablet   Generic drug:  hydroxychloroquine     60 tablet    Take 1 tablet (200 mg) by mouth 2 times daily       predniSONE 20 MG tablet    DELTASONE    2 tablet    Take 1 tablet (20 mg) by mouth daily       valACYclovir 1000 mg tablet    VALTREX    4 tablet    Take 2 tablets (2,000 mg) by mouth 2 times daily

## 2017-03-28 NOTE — LETTER
RETURN TO WORK/SCHOOL FORM    3/28/2017    Re: Minnie Garg  1980      To Whom It May Concern:     Minnie Garg was seen in clinic 3/28/17 for acute illness.  Please excuse from work 3/27/17 and 3/28/17      Amy Bhagat MD  3/28/2017 5:16 PM

## 2017-03-31 ENCOUNTER — OFFICE VISIT (OUTPATIENT)
Dept: FAMILY MEDICINE | Facility: CLINIC | Age: 37
End: 2017-03-31

## 2017-03-31 VITALS
RESPIRATION RATE: 20 BRPM | TEMPERATURE: 98.2 F | WEIGHT: 195 LBS | HEIGHT: 63 IN | SYSTOLIC BLOOD PRESSURE: 119 MMHG | BODY MASS INDEX: 34.55 KG/M2 | OXYGEN SATURATION: 98 % | DIASTOLIC BLOOD PRESSURE: 77 MMHG | HEART RATE: 56 BPM

## 2017-03-31 DIAGNOSIS — R05.9 COUGH: Primary | ICD-10-CM

## 2017-03-31 DIAGNOSIS — B00.1 HERPES LABIALIS: ICD-10-CM

## 2017-03-31 RX ORDER — BENZONATATE 200 MG/1
200 CAPSULE ORAL 3 TIMES DAILY PRN
Qty: 60 CAPSULE | Refills: 0 | Status: SHIPPED | OUTPATIENT
Start: 2017-03-31 | End: 2018-03-26

## 2017-03-31 RX ORDER — VALACYCLOVIR HYDROCHLORIDE 1 G/1
2000 TABLET, FILM COATED ORAL 2 TIMES DAILY
Qty: 4 TABLET | Refills: 0 | Status: SHIPPED | OUTPATIENT
Start: 2017-03-31 | End: 2018-03-26

## 2017-03-31 RX ORDER — CODEINE PHOSPHATE AND GUAIFENESIN 10; 100 MG/5ML; MG/5ML
1 SOLUTION ORAL EVERY 4 HOURS PRN
Qty: 120 ML | Refills: 0 | Status: SHIPPED | OUTPATIENT
Start: 2017-03-31 | End: 2017-05-02

## 2017-03-31 NOTE — PROGRESS NOTES
HPI:   SUBJECTIVE:  Patient is here to follow up recent diagnosis of non-strep pharyngitis, outbreak of herpes labialis and cough.  Earlier this week was prescribed an oral course of valacyclovir for herpes labialis, was also given a short course of steroids for painful pharyngitis.  She reports that she took these medications and that herpes labialis is improved.  She reports that her symptoms of pharyngitis have also improved since taking 2 days of 20 mg prednisone daily.  This has completed her course.  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  She is wondering if there is something else that can be done for cough.  Denies fevers, sweats, chills, nausea, vomiting, diarrhea.  Denies new rashes.          PMHX:     Patient Active Problem List   Diagnosis     Systemic lupus erythematosus (H)     Anemia, unspecified type     Non morbid obesity due to excess calories     Pap smear for cervical cancer screening       Current Outpatient Prescriptions   Medication Sig Dispense Refill     benzonatate (TESSALON) 200 MG capsule Take 1 capsule (200 mg) by mouth 3 times daily as needed for cough 60 capsule 0     valACYclovir (VALTREX) 1000 mg tablet Take 2 tablets (2,000 mg) by mouth 2 times daily 4 tablet 0     guaiFENesin-codeine (ROBITUSSIN AC) 100-10 MG/5ML SOLN solution Take 5 mLs by mouth every 4 hours as needed for cough 120 mL 0     predniSONE (DELTASONE) 20 MG tablet Take 1 tablet (20 mg) by mouth daily 2 tablet 0     [DISCONTINUED] valACYclovir (VALTREX) 1000 mg tablet Take 2 tablets (2,000 mg) by mouth 2 times daily 4 tablet 0     ibuprofen (ADVIL/MOTRIN) 400 MG tablet Take 1 tablet (400 mg) by mouth every 6 hours as needed for moderate pain 30 tablet 0     lidocaine (XYLOCAINE) 2 % solution swish and spit 10 ml every 3 hours as needed max 8 doses/24 hour period 100 mL 0      "norgestimate-ethinyl estradiol (ORTHO-CYCLEN, SPRINTEC) 0.25-35 MG-MCG per tablet Take 1 tablet by mouth daily 84 tablet 3     acetaminophen (TYLENOL) 325 MG tablet Take 650 mg by mouth       cyclobenzaprine (FLEXERIL) 10 MG tablet Take 1 tablet (10 mg) by mouth nightly as needed (pain at HS) 14 tablet 0     hydroxychloroquine (PLAQUENIL) 200 MG tablet Take 1 tablet (200 mg) by mouth 2 times daily 60 tablet 1          Allergies   Allergen Reactions     No Known Allergies        No results found for this or any previous visit (from the past 24 hour(s)).    Current Outpatient Prescriptions   Medication     benzonatate (TESSALON) 200 MG capsule     valACYclovir (VALTREX) 1000 mg tablet     guaiFENesin-codeine (ROBITUSSIN AC) 100-10 MG/5ML SOLN solution     predniSONE (DELTASONE) 20 MG tablet     [DISCONTINUED] valACYclovir (VALTREX) 1000 mg tablet     ibuprofen (ADVIL/MOTRIN) 400 MG tablet     lidocaine (XYLOCAINE) 2 % solution     norgestimate-ethinyl estradiol (ORTHO-CYCLEN, SPRINTEC) 0.25-35 MG-MCG per tablet     acetaminophen (TYLENOL) 325 MG tablet     cyclobenzaprine (FLEXERIL) 10 MG tablet     hydroxychloroquine (PLAQUENIL) 200 MG tablet     No current facility-administered medications for this visit.               Review of Systems:   ROS as described above.  Denies F/S/C/N/V/SOB/CP          Physical Exam:     Vitals:    03/31/17 0914   BP: 119/77   Pulse: 56   Resp: 20   Temp: 98.2  F (36.8  C)   SpO2: 98%   Weight: 195 lb (88.5 kg)   Height: 5' 2.75\" (159.4 cm)     Body mass index is 34.82 kg/(m^2).  GEN: patient sitting comfortably in NAD  HEEN: Head is atraumatic, normocephalic, eyes anicteric, mucous membranes moist, mild pharyngeal erythema without exudate.  Normal voice, lips with resolving herpes labialis.  Now dried scab without new lesions  CV: RRR w/o M/R/G  PULM: CTAB without w/r/r.  No cough today in clinic  ABD: soft, nontender, bowel sounds present  NEURO: Alert and oriented x3.  No focal motor " abnormalities.  Face symmetric.  PSYCH: appropriate  SKIN: herpes labialis resolving    Results for orders placed or performed in visit on 03/24/17   Rapid Strep Screen (Group) ([facility])   Result Value Ref Range    Rapid Strep A Screen NEGATIVE Negative       Assessment and Plan     ASSESSMENT AND PLAN:  This is a 37-year-old female with the following issues:   1.  Herpes labialis improved with oral course of valacyclovir.  The patient has a few events coming up.  One is a trip and also a wedding.  She has requested another one-time treatment for herpes labialis should it return and I honored this request today.   2.  Pharyngitis improved with steroids likely viral in nature, symptoms and exam are improving today.  No need for further treatment.   3.  Cough, likely related to pharyngitis, again likely viral.  Discussed with patient that it may take a few weeks for this to improve.  We will treat today with symptomatic management, Tessalon and some nighttime cough syrup.  Patient is in agreement with this plan.  She will give a cough for a few more weeks to resolve on its own.  She is welcome to follow up with me in clinic if needed.  Discussed signs and symptoms to return to clinic, new fevers, shortness of breath with activity, increased work of breathing.  She is knowledgeable of these indications.     1. Cough  - benzonatate (TESSALON) 200 MG capsule; Take 1 capsule (200 mg) by mouth 3 times daily as needed for cough  Dispense: 60 capsule; Refill: 0  - guaiFENesin-codeine (ROBITUSSIN AC) 100-10 MG/5ML SOLN solution; Take 5 mLs by mouth every 4 hours as needed for cough  Dispense: 120 mL; Refill: 0    2. Herpes labialis  - valACYclovir (VALTREX) 1000 mg tablet; Take 2 tablets (2,000 mg) by mouth 2 times daily  Dispense: 4 tablet; Refill: 0    Options for treatment and follow-up care were reviewed with the patient and/or guardian. Minnie Her and/or guardian engaged in the decision making process and verbalized  understanding of the options discussed and agreed with the final plan.    Amy Bhagat MD

## 2017-03-31 NOTE — MR AVS SNAPSHOT
"              After Visit Summary   3/31/2017    Minnie Garg    MRN: [id number]           Patient Information     Date Of Birth          1980        Visit Information        Provider Department      3/31/2017 9:20 AM Amy Bhagat MD [facility]        Today's Diagnoses     Cough    -  1    Herpes labialis           Follow-ups after your visit        Who to contact     Please call your clinic at [phone] to:    Ask questions about your health    Make or cancel appointments    Discuss your medicines    Learn about your test results    Speak to your doctor   If you have compliments or concerns about an experience at your clinic, or if you wish to file a complaint, please contact [facility] Patient Relations at [phone] or email us at [email or web address]         Additional Information About Your Visit        MyChart Information     [employer] gives you secure access to your electronic health record. If you see a primary care provider, you can also send messages to your care team and make appointments. If you have questions, please call your primary care clinic.  If you do not have a primary care provider, please call [phone] and they will assist you.      [employer] is an electronic gateway that provides easy, online access to your medical records. With [employer], you can request a clinic appointment, read your test results, renew a prescription or communicate with your care team.     To access your existing account, please contact your [facility] or call [phone] for assistance.        Care EveryWhere ID     This is your Care EveryWhere ID. This could be used by other organizations to access your [place] medical records  [id number]        Your Vitals Were     Pulse Temperature Respirations Height Pulse Oximetry BMI (Body Mass Index)    56 98.2  F (36.8  C) 20 5' 2.75\" (159.4 cm) 98% 34.82 kg/m2       Blood " Pressure from Last 3 Encounters:   03/31/17 119/77   03/28/17 137/80   03/24/17 116/74    Weight from Last 3 Encounters:   03/31/17 195 lb (88.5 kg)   03/28/17 194 lb 6.4 oz (88.2 kg)   03/24/17 194 lb (88 kg)              Today, you had the following     No orders found for display         Today's Medication Changes          These changes are accurate as of: 3/31/17 11:59 PM.  If you have any questions, ask your nurse or doctor.               Start taking these medicines.        Dose/Directions    benzonatate 200 MG capsule   Commonly known as:  TESSALON   Used for:  Cough   Started by:  Amy Bhagat MD        Dose:  200 mg   Take 1 capsule (200 mg) by mouth 3 times daily as needed for cough   Quantity:  60 capsule   Refills:  0       guaiFENesin-codeine 100-10 MG/5ML Soln solution   Commonly known as:  ROBITUSSIN AC   Used for:  Cough   Started by:  Amy Bhagat MD        Dose:  1 tsp.   Take 5 mLs by mouth every 4 hours as needed for cough   Quantity:  120 mL   Refills:  0            Where to get your medicines      These medications were sent to [facility] - [place], [place] - [address] [place] [address] Pkw[address]  [address], [place] [address]     Phone:  [phone]     benzonatate 200 MG capsule    valACYclovir 1000 mg tablet         Some of these will need a paper prescription and others can be bought over the counter.  Ask your nurse if you have questions.     Bring a paper prescription for each of these medications     guaiFENesin-codeine 100-10 MG/5ML Soln solution                Primary Care Provider Office Phone # Fax #    HANS Majano [facility] [phone]       [facility] [address] [place] [address]        Thank you!     Thank you for choosing [facility]  for your care. Our goal is always to provide you with excellent care. Hearing back from our patients is one way we can continue to improve our services.  Please take a few minutes to complete the written survey that you may receive in the mail after your visit with us. Thank you!             Your Updated Medication List - Protect others around you: Learn how to safely use, store and throw away your medicines at www.disposemymeds.org.          This list is accurate as of: 3/31/17 11:59 PM.  Always use your most recent med list.                   Brand Name Dispense Instructions for use    acetaminophen 325 MG tablet    TYLENOL     Take 650 mg by mouth       benzonatate 200 MG capsule    TESSALON    60 capsule    Take 1 capsule (200 mg) by mouth 3 times daily as needed for cough       FLEXERIL 10 MG tablet   Generic drug:  cyclobenzaprine     14 tablet    Take 1 tablet (10 mg) by mouth nightly as needed (pain at HS)       guaiFENesin-codeine 100-10 MG/5ML Soln solution    ROBITUSSIN AC    120 mL    Take 5 mLs by mouth every 4 hours as needed for cough       ibuprofen 400 MG tablet    ADVIL/MOTRIN    30 tablet    Take 1 tablet (400 mg) by mouth every 6 hours as needed for moderate pain       lidocaine 2 % solution    XYLOCAINE    100 mL    swish and spit 10 ml every 3 hours as needed max 8 doses/24 hour period       norgestimate-ethinyl estradiol 0.25-35 MG-MCG per tablet    ORTHO-CYCLEN, SPRINTEC    84 tablet    Take 1 tablet by mouth daily       PLAQUENIL 200 MG tablet   Generic drug:  hydroxychloroquine     60 tablet    Take 1 tablet (200 mg) by mouth 2 times daily       predniSONE 20 MG tablet    DELTASONE    2 tablet    Take 1 tablet (20 mg) by mouth daily       valACYclovir 1000 mg tablet    VALTREX    4 tablet    Take 2 tablets (2,000 mg) by mouth 2 times daily

## 2017-04-01 NOTE — PROGRESS NOTES
"  SUBJECTIVE: 36 year old female presents with 2 day hx sore throat with cough.  Began coughing last night, dry cough mostly and today has chills and sore throat.  No muscle aches, or fever measured.     Past Medical History:   Diagnosis Date     Lupus (H)      Sees [facility]umatologist, [facility]. Unable to get in prior to trip abroad in February, which went well without any illness.    OBJECTIVE: /74 (BP Location: Right arm, Patient Position: Chair, Cuff Size: Adult Regular)  Pulse 108  Temp 98.7  F (37.1  C) (Oral)  Ht 5' 2.75\" (159.4 cm)  Wt 194 lb (88 kg)  SpO2 97%  BMI 34.64 kg/m2    Exam:  Constitutional: alert and moderate distress  Head: Normocephalic. No masses, lesions, tenderness or abnormalities  Neck: Neck supple. No adenopathy. Thyroid symmetric, normal size,  ENT: ENT exam normal, no neck nodes or sinus tenderness  Cardiovascular: negative  Respiratory: negative findings: normal respiratory rate and rhythm, lungs clear to auscultation, positive findings: cough: dry but harsh  Gastrointestinal: Abdomen soft, non-tender. BS normal. No masses, organomegaly      ASSESSMENT/PLAN:     (R07.0) Throat pain  (primary encounter diagnosis)  Comment: primary complaintComment: rapid strep negative, patient was notified. Will be phoned if her back up TC ifs positive for strep A and antibiotic therapy indicated. Symptoms mild-moderate and no lymphadenopathy accompanying.  Fluids rest and if needed acetaminophen or throat lozenges available OTC will be utilized. Will RTC if s/s worsen with above measures or if new s/s develop.  Plan: Rapid Strep Screen (Group) ([facility]), ibuprofen         (ADVIL/MOTRIN) 400 MG tablet, lidocaine         (XYLOCAINE) 2 % solution        Instructed in appropriate use of the swish and spit topical pain relief        Cautioned may be contagious despite negative RST.  Symptomatic care and RTC if s/s worsen or if new s/s " "develop.  (J06.9) URI (upper respiratory infection)  Comment: Symptomatic care, fluids and rest, primarily with \"watch and wait\" theme.  20 minutes face to face time was spent on counseling and care coordination for this visit, which was > 50% of the visit time.  Makeda Santana,APRN                       "

## 2017-05-02 DIAGNOSIS — J03.80: ICD-10-CM

## 2017-05-02 DIAGNOSIS — B00.9: ICD-10-CM

## 2017-05-02 DIAGNOSIS — R05.9 COUGH: ICD-10-CM

## 2017-05-02 NOTE — TELEPHONE ENCOUNTER
[facility] phone call message- patient requesting a refill:    Full Medication Name: Prednisone 20mg & guaiFENesin-codeine 100-10 MG/5ML     Dose:     Pharmacy confirmed as   [facility] - [place], [place] - [address]  [address]  [address]  [place] [address]  Phone: [phone] Fax: [phone]  : Yes    Additional Comments:  Patient states that it helped a little, went out of state and when she returned it still is lingering.     OK to leave a message on voice mail? Yes    Primary language: English      needed? No    Call taken on May 2, 2017 at 11:50 AM by Violeta Kennedy

## 2017-05-03 NOTE — TELEPHONE ENCOUNTER
C/o cough returned after coming back from trip. Cough x 4-5 days, somewhat productive- yellowish to green phlegm. No fever/ chills. No shortness of breath. Coughing increases when lay down at nighttime, making it difficult to sleep. Has tried Nyquil, Robitussin DM without much effect. Would like to request a refill of Prednisone and Robitussin AC, also given by Dr Bhagat over a month ago when was seen in clinic. Ben PATTERSON

## 2017-05-04 RX ORDER — PREDNISONE 20 MG/1
20 TABLET ORAL DAILY
Qty: 2 TABLET | Refills: 0 | Status: SHIPPED | OUTPATIENT
Start: 2017-05-04 | End: 2018-03-26

## 2017-05-04 RX ORDER — CODEINE PHOSPHATE AND GUAIFENESIN 10; 100 MG/5ML; MG/5ML
1 SOLUTION ORAL EVERY 4 HOURS PRN
Qty: 120 ML | Refills: 0 | Status: SHIPPED | OUTPATIENT
Start: 2017-05-04 | End: 2018-03-26

## 2017-05-04 NOTE — TELEPHONE ENCOUNTER
Patient is calling again and wanted to know if this could get approved today. She will be over in this area today. Please see if Makeda Santana can take a look at this and send it over to the pharmacy ASAP!

## 2017-05-04 NOTE — TELEPHONE ENCOUNTER
Patient called after hours.   Rx had not been received by [facility] per patient.   Note they are still pending in chart despite patient being told earlier today that they were filled by [facility].     Sent rx that were pending.   Note, strange to do just two days of prednisone.     Verified with [facility] that they did receive rx.     Wendy Mcallister MD

## 2018-03-26 ENCOUNTER — OFFICE VISIT (OUTPATIENT)
Dept: FAMILY MEDICINE | Facility: CLINIC | Age: 38
End: 2018-03-26
Payer: COMMERCIAL

## 2018-03-26 VITALS
SYSTOLIC BLOOD PRESSURE: 118 MMHG | TEMPERATURE: 97.9 F | WEIGHT: 202.4 LBS | OXYGEN SATURATION: 98 % | DIASTOLIC BLOOD PRESSURE: 83 MMHG | BODY MASS INDEX: 35.86 KG/M2 | HEIGHT: 63 IN | RESPIRATION RATE: 20 BRPM | HEART RATE: 72 BPM

## 2018-03-26 DIAGNOSIS — K11.20 PAROTITIS: Primary | ICD-10-CM

## 2018-03-26 NOTE — PROGRESS NOTES
Preceptor Attestation:  Patient's case reviewed and discussed with Jenifer Fernandes, DO Patient seen and discussed with the resident.. I agree with assessment and plan of care.  Supervising Physician:  Luis Carrasco MD  [facility]

## 2018-03-26 NOTE — MR AVS SNAPSHOT
After Visit Summary   3/26/2018    Minnie Garg    MRN: [id number]           Patient Information     Date Of Birth          1980        Visit Information        Provider Department      3/26/2018 10:40 AM Jenifer Fernandes,  [facility]        Today's Diagnoses     Parotitis    -  1       Follow-ups after your visit        Follow-up notes from your care team     Return in about 2 weeks (around 4/9/2018).      Your next 10 appointments already scheduled     Apr 13, 2018 10:00 AM CDT   Return Visit with Amy Bhagat MD   [facility] ([facility])    [address] [place] [id number]   [phone]              Who to contact     Please call your clinic at [phone] to:    Ask questions about your health    Make or cancel appointments    Discuss your medicines    Learn about your test results    Speak to your doctor            Additional Information About Your Visit        MyChart Information     [employer] gives you secure access to your electronic health record. If you see a primary care provider, you can also send messages to your care team and make appointments. If you have questions, please call your primary care clinic.  If you do not have a primary care provider, please call [phone] and they will assist you.      [employer] is an electronic gateway that provides easy, online access to your medical records. With [employer], you can request a clinic appointment, read your test results, renew a prescription or communicate with your care team.     To access your existing account, please contact your [facility] or call [phone] for assistance.        Care EveryWhere ID     This is your Care EveryWhere ID. This could be used by other organizations to access your [place] medical records  [id number]        Your Vitals Were     Pulse Temperature Respirations Height Last Period Pulse Oximetry    72 97.9  F (36.6  C)  "(Oral) 20 5' 2.5\" (158.8 cm) 02/26/2018 98%    BMI (Body Mass Index)                   36.43 kg/m2            Blood Pressure from Last 3 Encounters:   03/26/18 118/83   03/31/17 119/77   03/28/17 137/80    Weight from Last 3 Encounters:   03/26/18 202 lb 6.4 oz (91.8 kg)   03/31/17 195 lb (88.5 kg)   03/28/17 194 lb 6.4 oz (88.2 kg)              Today, you had the following     No orders found for display         Today's Medication Changes          These changes are accurate as of 3/26/18 11:59 PM.  If you have any questions, ask your nurse or doctor.               Start taking these medicines.        Dose/Directions    amoxicillin-clavulanate 875-125 MG per tablet   Commonly known as:  AUGMENTIN   Used for:  Parotitis   Started by:  Jenifer Fernandes,         Dose:  1 tablet   Take 1 tablet by mouth 2 times daily   Quantity:  28 tablet   Refills:  0            Where to get your medicines      These medications were sent to [facility] - [place], [place] - [address] [place], [place] [address]  [address] [facility] [address] [place] [address]     Phone:  [phone]     amoxicillin-clavulanate 875-125 MG per tablet                Primary Care Provider Office Phone # Fax #    Amy Bhagat [phone]       [facility] [address] [place] [address]        Equal Access to Services     TIMUR QUIROZ AH: Hadii nic garner hadasho Soomaali, waaxda luqadaha, qaybta kaalmada adeegyada, waxay eliana mcgraw adechaz damon. So [facility] [phone].    ATENCIÓN: Si habla español, tiene a weston disposición servicios gratuitos de asistencia lingüística. Llame al [phone].    We comply with applicable federal civil rights laws and [place] laws. We do not discriminate on the basis of race, color, national origin, age, disability, sex, sexual orientation, or gender identity.            Thank you!     Thank you for choosing [facility]  for your care. Our goal is always to provide you " with excellent care. Hearing back from our patients is one way we can continue to improve our services. Please take a few minutes to complete the written survey that you may receive in the mail after your visit with us. Thank you!             Your Updated Medication List - Protect others around you: Learn how to safely use, store and throw away your medicines at www.disposemymeds.org.          This list is accurate as of 3/26/18 11:59 PM.  Always use your most recent med list.                   Brand Name Dispense Instructions for use Diagnosis    acetaminophen 325 MG tablet    TYLENOL     Take 650 mg by mouth        amoxicillin-clavulanate 875-125 MG per tablet    AUGMENTIN    28 tablet    Take 1 tablet by mouth 2 times daily    Parotitis       FLEXERIL 10 MG tablet   Generic drug:  cyclobenzaprine     14 tablet    Take 1 tablet (10 mg) by mouth nightly as needed (pain at HS)        ibuprofen 400 MG tablet    ADVIL/MOTRIN    30 tablet    Take 1 tablet (400 mg) by mouth every 6 hours as needed for moderate pain    URI (upper respiratory infection), Throat pain       PLAQUENIL 200 MG tablet   Generic drug:  hydroxychloroquine     60 tablet    Take 200 mg by mouth daily as needed

## 2018-03-26 NOTE — PROGRESS NOTES
"[facility] - Family Medicine    Minnie Garg is a 38 year old  female with past medical history including systemic lupus erythematosus presenting for chief complaint of:     Patient presents with:  Pharyngitis: Took ibuprofen and theraflu did not help  Lymph Node: Swollen on left side noticed x1 day  Chills  Medication Reconciliation: Completed.       SUBJECTIVE:        HPI:  1. Sore throat, neck swelling:   Since yesterday patient has had a sore throat, runny nose and chills along with swelling and soreness of her left neck and face. She notes that she tends to get ill with similar symptoms this time of year between winter and spring. She has also similar swelling and pain over her left neck/face when she \"gets sick\" but it has never been this swollen. No increased pain or swelling with eating, nor any numbness, tingling of the left face, no facial droop. Has taken ibuprofen and Theraflu without improvement of symptoms. She denies any cough, dyspnea, fevers, tooth pain, dysphagia, odynophagia. Also denies any weight loss, fatigue, night sweats. Does have intermittent myalgias with her lupus. 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 Lives with a nephew but he is not ill, denies any other sick contacts.     History provided by patient    ROS:   CONSTITUTIONAL: Positive for chills, no fever, weight loss, fatigue, anorexia  HEENT: Positive for sore throat, runny nose. No ear pain  HEART: No chest pain, palpitations  LUNGS: No cough, wheezing, dyspnea  GI: No abdominal pain, nausea/vomiting  : No dysuria, other urinary symptoms   VASCULAR: No LE edema   NEURO: No dizziness, lightheadedness, numbness, tingling, facial droop  MSK: Intermittent myalgias, chronic   PSYCH: No mood changes   ENDOCRINE: No heat or cold intolerance   DERM: No rash or itching   HEME/LYMPH: Left neck/face swelling   Remainder of ROS negative unless otherwise mentioned above or in HPI    HISTORY:     Patient Active " "Problem List   Diagnosis     Systemic lupus erythematosus (H)     Anemia, unspecified type     Non morbid obesity due to excess calories     Pap smear for cervical cancer screening      Past Medical History:   Diagnosis Date     Lupus      Past Surgical History:   Procedure Laterality Date     NO HISTORY OF SURGERY        Allergies   Allergen Reactions     No Known Allergies      Current Outpatient Prescriptions   Medication     ibuprofen (ADVIL/MOTRIN) 400 MG tablet     acetaminophen (TYLENOL) 325 MG tablet     cyclobenzaprine (FLEXERIL) 10 MG tablet     hydroxychloroquine (PLAQUENIL) 200 MG tablet     predniSONE (DELTASONE) 20 MG tablet     guaiFENesin-codeine (ROBITUSSIN AC) 100-10 MG/5ML SOLN solution     benzonatate (TESSALON) 200 MG capsule     valACYclovir (VALTREX) 1000 mg tablet     lidocaine (XYLOCAINE) 2 % solution     norgestimate-ethinyl estradiol (ORTHO-CYCLEN, SPRINTEC) 0.25-35 MG-MCG per tablet     No current facility-administered medications for this visit.        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 In no acute distress   HEENT: Atraumatic, normocephalic. Moist mucous membranes. Posterior oropharynx clear, non-erythematous. No tonsillar swelling or exudates. Bilateral TMs clear without erythema, bulging or dullness. No frontal or maxillary sinus tenderness. Normal dentition with no dental caries visualized.   NECK/LYMPH: Large ~2x1 cm area of firmness over the left neck/face anterior to the left ramus of the mandible, extending to about the angle of the mandible. Area is non-tender, no crepitus, no fluctuance, no overlying erythema. Otherwise no palpable cervical, supraclavicular, or axillary lymphadenopathy.   HEART: RRR, normal S1 and S2, no m/r/g   LUNGS: No respiratory distress, lungs CTA "   GI: Normal bowel sounds. Soft, non-tender abdomen without rebound, guarding, masses, organomegaly.   : deferred  MSK: No gross deformities.   NEURO: Alert and oriented to person, place, time and events. Moves all extremities spontaneously. Normal gait. Answers questions appropriately.   DERM: No obvious rashes on exposed skin   PSYCH:  Pleasant, cooperative, normal affect     LABS:  n/a    IMAGING:  n/a     ASSESSMENT & PLAN:      Minnie Garg is a 38 year old  female with a PMHx of lupus who presented for a 1 day hx of sore throat and left neck/face swelling.     1. Parotitis  - amoxicillin-clavulanate (AUGMENTIN) 875-125 MG per tablet; Take 1 tablet by mouth 2 times daily  Dispense: 28 tablet; Refill: 0     Given acute onset and systemic symptoms, will treat with antibiotics.     Ddx includes but is not limited to:   -Parotid gland does not appear to be suppurative.   -Could be viral URI but diagnosis of exclusion  -Strep throat unlikely, Centor criteria of 2, deferring  -Exam findings not consistent with dental abscess, Lopez's angina, other infectious process.  -Lymphoma on the differential especially with history of SLE, but patient has not had B-symptom including night sweats, weight loss, itching.     Health Maintenance: up to date      RTC: follow up examination in 2 weeks     Options for treatment and follow-up care were reviewed with the patient and/or guardian. Minnie Garg and/or guardian engaged in the decision making process and verbalized understanding of the options discussed and agreed with the final plan.    Precepted today with: Dr. Luis rAias Disclosure:   I, Wing Pollack, am serving as a scribe; to document services personally performed by Dr. Jenifer Fernandes- -based on data collection and the provider's statements to me.     Provider Disclosure:  I agree with above History, Review of Systems, Physical exam and Plan.  I have reviewed the content of the documentation and have  edited it as needed. I have personally performed the services documented here and the documentation accurately represents those services and the decisions I have made.      Electronically signed by:  Jenifer Fernandes DO   [facility] Resident, PGY-3

## 2018-04-13 ENCOUNTER — OFFICE VISIT (OUTPATIENT)
Dept: FAMILY MEDICINE | Facility: CLINIC | Age: 38
End: 2018-04-13
Payer: COMMERCIAL

## 2018-04-13 VITALS
SYSTOLIC BLOOD PRESSURE: 113 MMHG | DIASTOLIC BLOOD PRESSURE: 77 MMHG | HEIGHT: 63 IN | TEMPERATURE: 98.3 F | OXYGEN SATURATION: 97 % | HEART RATE: 72 BPM | WEIGHT: 197.6 LBS | BODY MASS INDEX: 35.01 KG/M2

## 2018-04-13 DIAGNOSIS — Z87.19 H/O PAROTITIS: Primary | ICD-10-CM

## 2018-04-13 DIAGNOSIS — M32.9 SYSTEMIC LUPUS ERYTHEMATOSUS, UNSPECIFIED SLE TYPE, UNSPECIFIED ORGAN INVOLVEMENT STATUS (H): ICD-10-CM

## 2018-04-13 DIAGNOSIS — L85.3 XEROSIS CUTIS: ICD-10-CM

## 2018-04-13 NOTE — PATIENT INSTRUCTIONS
Things that can increase liklihood of a stone:  -Dehydration  -diuretics  -anticholinergic medications  -trauma

## 2018-04-13 NOTE — PROGRESS NOTES
HPI:       Minnie Garg is a 38 year old  female who presents to address the following concerns:    Parotid gland swelling  Patient seen on 3/26/2018 for parotid gland swelling diagnosed with parotitis.  She was treated with Augmentin of which she completed the course.  Reports that symptoms have resolved.  Also reports that she had a milder episode approximately 1 year ago but did not require antibiotics for that.  Denies pus or purulence associated with swelling.  Differential at that time included viral infection versus stone.  Patient with no continued symptoms of stone per her report.  Denies fever sweats chills pain with eating pain at the parotid gland.    Patient is reporting some dry hands today she works as a  with her sister.  She says that she is having troubles finding his way to have the hands stay hydrated.  Significant cracking irritation and dryness in the hands.  Looking for advice related to this.    Lupus  Patient reports that she has an appointment coming up with rheumatology.  A medication review shows that she should be taking Plaquenil daily.  She reports that she takes it intermittently at best.  Last visit with rheumatology was 2 years ago.  She denies active symptoms of lupus.    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  Denies illicits  Denies significant alcohol use.             PMHX:     Patient Active Problem List   Diagnosis     Systemic lupus erythematosus (H)     Anemia, unspecified type     Non morbid obesity due to excess calories     Pap smear for cervical cancer screening       Current Outpatient Prescriptions   Medication Sig Dispense Refill     cyclobenzaprine (FLEXERIL) 10 MG tablet Take 1 tablet (10 mg) by mouth nightly as needed (pain at HS) 14 tablet 0     hydroxychloroquine (PLAQUENIL) 200 MG tablet Take 200 mg by mouth daily as needed  60 tablet 1     ibuprofen (ADVIL/MOTRIN) 400 MG tablet Take 1 tablet (400 mg) by mouth every 6 hours as  "needed for moderate pain (Patient not taking: Reported on 4/13/2018) 30 tablet 0     acetaminophen (TYLENOL) 325 MG tablet Take 650 mg by mouth            Allergies   Allergen Reactions     No Known Allergies        No results found for this or any previous visit (from the past 24 hour(s)).    Current Outpatient Prescriptions   Medication     cyclobenzaprine (FLEXERIL) 10 MG tablet     hydroxychloroquine (PLAQUENIL) 200 MG tablet     ibuprofen (ADVIL/MOTRIN) 400 MG tablet     acetaminophen (TYLENOL) 325 MG tablet     No current facility-administered medications for this visit.               Review of Systems:   ROS as described above.  Denies F/S/C/N/V/SOB/CP          Physical Exam:     Vitals:    04/13/18 1003   BP: 113/77   Pulse: 72   Temp: 98.3  F (36.8  C)   TempSrc: Oral   SpO2: 97%   Weight: 197 lb 9.6 oz (89.6 kg)   Height: 5' 2.5\" (158.8 cm)     Body mass index is 35.57 kg/(m^2).    GEN: patient sitting comfortably in NAD.  HEEN: Head is atraumatic, normocephalic, eyes anicteric, mucous membranes moist.  Face symmetric.  Parotid gland normal by palpation.  No tenderness on examination  CV: RRR w/o M/R/G  PULM: CTAB without w/r/r  ABD: soft, nontender, bowel sounds present  NEURO: Alert and oriented x3.  No focal motor abnormalities.  Face symmetric.  PSYCH: appropriate  SKIN: diffusely dry hands with some cracking and skin thickening.     Imaging: none    Assessment and Plan     Recurrent parotitis  From history and chart review as well as patient input it appears that patient has had 2 episodes of parotid duct swelling in the last few years.  She reports that the most recent episode on 3/26/2018 was the most severe.  At that time treated for parotitis with with Augmentin.  Symptoms now resolved.  Patient completed course of Augmentin.  Discussed potential for evaluation of parotid duct stone/sludge stone formation.  Reviewed factors that can influence stone formation such as dehydration.  Discussed with " patient option of ENT.  We have agreed that if she has 1 additional episode of parotid gland swelling that she will have an EENT consultation.  Will hold off on consultation for now.  Encourage p.o. intake fluids.  I have reviewed her medications there is no medication that should promote this.  Next    History of lupus  Patient has a scheduled visit with rheumatology coming up.  She has not been taking her Plaquenil consistently.  Encouraged her to discuss with this with rheumatology but made no changes today.    Dry hands.  Exacerbated by dishwashing as job.  Patient's hands are extended excessively dry today.  Discussed lubrication options and have settled on Vaseline.  Should be applied multiple days.  Avoid triggers.  Consider gloves with dishwashing.    Patient will return if she experiences another episode of parotid gland swelling.  At that time I would recommend ENT referral for further evaluation.  She is in agreement with this plan.  Options for treatment and follow-up care were reviewed with the patient and/or guardian. Minnie Her and/or guardian engaged in the decision making process and verbalized understanding of the options discussed and agreed with the final plan.    Amy Bhagat MD

## 2018-04-13 NOTE — MR AVS SNAPSHOT
"              After Visit Summary   4/13/2018    Minnie Garg    MRN: [id number]           Patient Information     Date Of Birth          1980        Visit Information        Provider Department      4/13/2018 10:00 AM Amy Bhagat MD [facility]        Today's Diagnoses     H/O parotitis    -  1    Systemic lupus erythematosus, unspecified SLE type, unspecified organ involvement status (H)        Xerosis cutis          Care Instructions    Things that can increase liklihood of a stone:  -Dehydration  -diuretics  -anticholinergic medications  -trauma                 Follow-ups after your visit        Who to contact     Please call your clinic at [phone] to:    Ask questions about your health    Make or cancel appointments    Discuss your medicines    Learn about your test results    Speak to your doctor            Additional Information About Your Visit        [employer] Information     [employer] gives you secure access to your electronic health record. If you see a primary care provider, you can also send messages to your care team and make appointments. If you have questions, please call your primary care clinic.  If you do not have a primary care provider, please call [phone] and they will assist you.      [employer] is an electronic gateway that provides easy, online access to your medical records. With [employer], you can request a clinic appointment, read your test results, renew a prescription or communicate with your care team.     To access your existing account, please contact your [facility] or call [phone] for assistance.        Care EveryWhere ID     This is your Care EveryWhere ID. This could be used by other organizations to access your [place] medical records  [id number]        Your Vitals Were     Pulse Temperature Height Pulse Oximetry BMI (Body Mass Index)       72 98.3  F (36.8  C) (Oral) 5' 2.5\" (158.8 cm) 97% 35.57 kg/m2        " Blood Pressure from Last 3 Encounters:   04/13/18 113/77   03/26/18 118/83   03/31/17 119/77    Weight from Last 3 Encounters:   04/13/18 197 lb 9.6 oz (89.6 kg)   03/26/18 202 lb 6.4 oz (91.8 kg)   03/31/17 195 lb (88.5 kg)              Today, you had the following     No orders found for display         Today's Medication Changes          These changes are accurate as of 4/13/18 10:48 AM.  If you have any questions, ask your nurse or doctor.               Stop taking these medicines if you haven't already. Please contact your care team if you have questions.     FLEXERIL 10 MG tablet   Generic drug:  cyclobenzaprine   Stopped by:  Amy Bhagat MD                    Primary Care Provider Office Phone # Fax #    Amy Bhagat [phone]       [facility] [address] [place] [address]        Equal Access to Services     [facility] AH: Hadii aad ku hadasho Soomaali, waaxda luqadaha, qaybta kaalmada adeegyada, waxay idiin hayaan adeeg kharash la'gavinn . So [facility] [phone].    ATENCIÓN: Si habla español, tiene a weston disposición servicios gratuitos de asistencia lingüística. Llame al [phone].    We comply with applicable federal civil rights laws and [place] laws. We do not discriminate on the basis of race, color, national origin, age, disability, sex, sexual orientation, or gender identity.            Thank you!     Thank you for choosing [facility]  for your care. Our goal is always to provide you with excellent care. Hearing back from our patients is one way we can continue to improve our services. Please take a few minutes to complete the written survey that you may receive in the mail after your visit with us. Thank you!             Your Updated Medication List - Protect others around you: Learn how to safely use, store and throw away your medicines at www.disposemymeds.org.          This list is accurate as of 4/13/18 10:48 AM.  Always use  your most recent med list.                   Brand Name Dispense Instructions for use Diagnosis    acetaminophen 325 MG tablet    TYLENOL     Take 650 mg by mouth        ibuprofen 400 MG tablet    ADVIL/MOTRIN    30 tablet    Take 1 tablet (400 mg) by mouth every 6 hours as needed for moderate pain    URI (upper respiratory infection), Throat pain       PLAQUENIL 200 MG tablet   Generic drug:  hydroxychloroquine     60 tablet    Take 200 mg by mouth daily as needed

## 2018-04-30 ENCOUNTER — OFFICE VISIT (OUTPATIENT)
Dept: FAMILY MEDICINE | Facility: CLINIC | Age: 38
End: 2018-04-30
Payer: COMMERCIAL

## 2018-04-30 VITALS
DIASTOLIC BLOOD PRESSURE: 82 MMHG | BODY MASS INDEX: 36.07 KG/M2 | WEIGHT: 196 LBS | HEIGHT: 62 IN | HEART RATE: 100 BPM | RESPIRATION RATE: 18 BRPM | SYSTOLIC BLOOD PRESSURE: 120 MMHG | OXYGEN SATURATION: 96 % | TEMPERATURE: 98.7 F

## 2018-04-30 DIAGNOSIS — R60.9 PAROTID SWELLING: Primary | ICD-10-CM

## 2018-04-30 DIAGNOSIS — B00.1 HERPES LABIALIS: ICD-10-CM

## 2018-04-30 RX ORDER — VALACYCLOVIR HYDROCHLORIDE 1 G/1
2000 TABLET, FILM COATED ORAL 2 TIMES DAILY
Qty: 4 TABLET | Refills: 1 | Status: SHIPPED | OUTPATIENT
Start: 2018-04-30 | End: 2018-09-06

## 2018-04-30 NOTE — PATIENT INSTRUCTIONS
Referral for ( TEST )  :      Otolaryngology   LOCATION/PLACE/Provider :     [facility]  [address] [place], [place]   DATE & TIME :     5-2-2018 at 9:00am  PHONE :     [phone]  FAX :     [phone]  Appointment made by clinic staff/:    Makeda

## 2018-04-30 NOTE — MR AVS SNAPSHOT
After Visit Summary   4/30/2018    Minnie Garg    MRN: [id number]           Patient Information     Date Of Birth          1980        Visit Information        Provider Department      4/30/2018 2:00 PM Amy Bhagat MD [facility]        Today's Diagnoses     Parotid swelling    -  1    Herpes labialis          Care Instructions    Referral for ( TEST )  :      Otolaryngology   LOCATION/PLACE/Provider :     [facility]  [address] [facility] [place], [place]   DATE & TIME :     5-2-2018 at 9:00am  PHONE :     [phone]  FAX :     [phone]  Appointment made by clinic staff/:    Makeda            Follow-ups after your visit        Additional Services     OTOLARYNGOLOGY REFERRAL       REcurrent parotic enlargement    Reason for Referral: recurrent parotid enlargement     needed: No  Language: English    May leave message on voicemail: yes    ([facility] Only) Referral should be tracked (Yes/No)?                  Who to contact     Please call your clinic at [phone] to:    Ask questions about your health    Make or cancel appointments    Discuss your medicines    Learn about your test results    Speak to your doctor            Additional Information About Your Visit        [employer] Information     [employer] gives you secure access to your electronic health record. If you see a primary care provider, you can also send messages to your care team and make appointments. If you have questions, please call your primary care clinic.  If you do not have a primary care provider, please call [phone] and they will assist you.      [employer] is an electronic gateway that provides easy, online access to your medical records. With [employer], you can request a clinic appointment, read your test results, renew a prescription or communicate with your care team.     To access your existing account, please contact your [facility] or call  "[phone] for assistance.        Care EveryWhere ID     This is your Care EveryWhere ID. This could be used by other organizations to access your [place] medical records  [id number]        Your Vitals Were     Pulse Temperature Respirations Height Pulse Oximetry BMI (Body Mass Index)    100 98.7  F (37.1  C) 18 5' 2\" (157.5 cm) 96% 35.85 kg/m2       Blood Pressure from Last 3 Encounters:   04/30/18 120/82   04/13/18 113/77   03/26/18 118/83    Weight from Last 3 Encounters:   04/30/18 196 lb (88.9 kg)   04/13/18 197 lb 9.6 oz (89.6 kg)   03/26/18 202 lb 6.4 oz (91.8 kg)                 Today's Medication Changes          These changes are accurate as of 4/30/18 11:59 PM.  If you have any questions, ask your nurse or doctor.               Start taking these medicines.        Dose/Directions    valACYclovir 1000 mg tablet   Commonly known as:  VALTREX   Used for:  Herpes labialis   Started by:  Amy Bhagat MD        Dose:  2000 mg   Take 2 tablets (2,000 mg) by mouth 2 times daily   Quantity:  4 tablet   Refills:  1         Stop taking these medicines if you haven't already. Please contact your care team if you have questions.     ibuprofen 400 MG tablet   Commonly known as:  ADVIL/MOTRIN   Stopped by:  Amy Bhagat MD                Where to get your medicines      These medications were sent to [facility] - [place], [place] - [address]  [address], [place] [address]     Phone:  [phone]     valACYclovir 1000 mg tablet                Primary Care Provider Office Phone # Fax #    Amy Bhagat -[phone]       [facility] [address] [place] [address]        Equal Access to Services     TIMUR QUIROZ AH: Nancy garner hadasho Soomaali, waaxda luqadaha, qaybta kaalmada adeegyada, waxay eliana damon. So [facility] [phone].    ATENCIÓN: Si habla español, tiene a weston disposición servicios " jackie de asistencia lingüística. Nataly devine [phone].    We comply with applicable federal civil rights laws and [place] laws. We do not discriminate on the basis of race, color, national origin, age, disability, sex, sexual orientation, or gender identity.            Thank you!     Thank you for choosing [facility]  for your care. Our goal is always to provide you with excellent care. Hearing back from our patients is one way we can continue to improve our services. Please take a few minutes to complete the written survey that you may receive in the mail after your visit with us. Thank you!             Your Updated Medication List - Protect others around you: Learn how to safely use, store and throw away your medicines at www.disposemymeds.org.          This list is accurate as of 4/30/18 11:59 PM.  Always use your most recent med list.                   Brand Name Dispense Instructions for use Diagnosis    acetaminophen 325 MG tablet    TYLENOL     Take 650 mg by mouth        PLAQUENIL 200 MG tablet   Generic drug:  hydroxychloroquine     60 tablet    Take 200 mg by mouth daily as needed        valACYclovir 1000 mg tablet    VALTREX    4 tablet    Take 2 tablets (2,000 mg) by mouth 2 times daily    Herpes labialis

## 2018-05-12 NOTE — PROGRESS NOTES
"       HPI:       Minnie Garg is a 38 year old  female who presents to address the following concerns:    Patient returns to discuss ongoing sx of swelling left parotid.  Seen 3/26/18 and 4/13/18 for this issue.  Treated with augmentin 3/26/18 and the sx improved.  Patient reports that she feels swelling is returning but is currently mild.      Patient also reporting sx pain and burning in perineal area.  No hx STD. Sexually active with  only.  Has had similar episodes in the past.             PMHX:     Patient Active Problem List   Diagnosis     Systemic lupus erythematosus (H)     Anemia, unspecified type     Non morbid obesity due to excess calories     Pap smear for cervical cancer screening     Xerosis cutis       Current Outpatient Prescriptions   Medication Sig Dispense Refill     acetaminophen (TYLENOL) 325 MG tablet Take 650 mg by mouth       hydroxychloroquine (PLAQUENIL) 200 MG tablet Take 200 mg by mouth daily as needed  60 tablet 1     valACYclovir (VALTREX) 1000 mg tablet Take 2 tablets (2,000 mg) by mouth 2 times daily 4 tablet 1          Allergies   Allergen Reactions     No Known Allergies        No results found for this or any previous visit (from the past 24 hour(s)).    Current Outpatient Prescriptions   Medication     acetaminophen (TYLENOL) 325 MG tablet     hydroxychloroquine (PLAQUENIL) 200 MG tablet     valACYclovir (VALTREX) 1000 mg tablet     No current facility-administered medications for this visit.               Review of Systems:   ROS as described above.  Denies F/S/C/N/V/SOB/CP          Physical Exam:     Vitals:    04/30/18 1404   BP: 120/82   Pulse: 100   Resp: 18   Temp: 98.7  F (37.1  C)   SpO2: 96%   Weight: 196 lb (88.9 kg)   Height: 5' 2\" (157.5 cm)     Body mass index is 35.85 kg/(m^2).    GEN: patient sitting comfortably in NAD  HEEN: Head is atraumatic, normocephalic, eyes anicteric, mucous membranes moist  CV: RRR w/o M/R/G  PULM: CTAB without w/r/r  ABD: soft, " nontender, bowel sounds present  NEURO: Alert and oriented x3.  No focal motor abnormalities.  Face symmetric.  PSYCH: appropriate  SKIN: Vesicular rash right labia    Results for orders placed or performed in visit on 03/24/17   Rapid Strep Screen (Group) ([facility])   Result Value Ref Range    Rapid Strep A Screen NEGATIVE Negative       Assessment and Plan     1. Herpes labialis-presentation consistent with dx.  tx valtrex 2017.  No culture confirmation at this time  - valACYclovir (VALTREX) 1000 mg tablet; Take 2 tablets (2,000 mg) by mouth 2 times daily  Dispense: 4 tablet; Refill: 1    2. Parotid swelling-patient with recurrent swelling parotid gland.  Concern for obstruction.  Treated recently with augmentin which improved sx but patient feels swelling is returning.  Imaging not yet performed.  Will send on to ENT for evaluation and consideration imaging.  - OTOLARYNGOLOGY REFERRAL; Future      Options for treatment and follow-up care were reviewed with the patient and/or guardian. Minnie Her and/or guardian engaged in the decision making process and verbalized understanding of the options discussed and agreed with the final plan.    Amy Bhagat MD

## 2018-08-14 ENCOUNTER — OFFICE VISIT (OUTPATIENT)
Dept: FAMILY MEDICINE | Facility: CLINIC | Age: 38
End: 2018-08-14
Payer: COMMERCIAL

## 2018-08-14 VITALS
HEART RATE: 65 BPM | OXYGEN SATURATION: 99 % | BODY MASS INDEX: 35.74 KG/M2 | WEIGHT: 194.2 LBS | DIASTOLIC BLOOD PRESSURE: 67 MMHG | SYSTOLIC BLOOD PRESSURE: 106 MMHG | TEMPERATURE: 98.3 F | HEIGHT: 62 IN

## 2018-08-14 DIAGNOSIS — B07.9 VIRAL WART ON FINGER: ICD-10-CM

## 2018-08-14 DIAGNOSIS — R21 RASH OF NECK: Primary | ICD-10-CM

## 2018-08-14 RX ORDER — BENZOCAINE/MENTHOL 6 MG-10 MG
LOZENGE MUCOUS MEMBRANE
Qty: 30 G | Refills: 0 | Status: SHIPPED | OUTPATIENT
Start: 2018-08-14 | End: 2018-11-15

## 2018-08-14 NOTE — PATIENT INSTRUCTIONS
-hydrocortisone cream to area up to three times daily as needed  -can take zyrtec, allegra, or benadryl for itching (may make you sleepy)  -keep area dry, avoid heat/sun until its resolved

## 2018-08-14 NOTE — MR AVS SNAPSHOT
After Visit Summary   8/14/2018    Minnie Garg    MRN: [id number]           Patient Information     Date Of Birth          1980        Visit Information        Provider Department      8/14/2018 8:40 AM Stewart, Haley, DO [facility]        Today's Diagnoses     Rash of neck    -  1    Viral wart on finger          Care Instructions    -hydrocortisone cream to area up to three times daily as needed  -can take zyrtec, allegra, or benadryl for itching (may make you sleepy)  -keep area dry, avoid heat/sun until its resolved            Follow-ups after your visit        Follow-up notes from your care team     Return if symptoms worsen or fail to improve.      Who to contact     Please call your clinic at [phone] to:    Ask questions about your health    Make or cancel appointments    Discuss your medicines    Learn about your test results    Speak to your doctor            Additional Information About Your Visit        MyChart Information     [employer] gives you secure access to your electronic health record. If you see a primary care provider, you can also send messages to your care team and make appointments. If you have questions, please call your primary care clinic.  If you do not have a primary care provider, please call [phone] and they will assist you.      [employer] is an electronic gateway that provides easy, online access to your medical records. With [employer], you can request a clinic appointment, read your test results, renew a prescription or communicate with your care team.     To access your existing account, please contact your [facility] or call [phone] for assistance.        Care EveryWhere ID     This is your Care EveryWhere ID. This could be used by other organizations to access your [facility] medical records  [id number]        Your Vitals Were     Pulse Temperature Height Pulse Oximetry BMI (Body Mass Index)       65 98.3  F  "(36.8  C) (Oral) 5' 2\" (157.5 cm) 99% 35.52 kg/m2        Blood Pressure from Last 3 Encounters:   08/14/18 106/67   04/30/18 120/82   04/13/18 113/77    Weight from Last 3 Encounters:   08/14/18 194 lb 3.2 oz (88.1 kg)   04/30/18 196 lb (88.9 kg)   04/13/18 197 lb 9.6 oz (89.6 kg)              We Performed the Following     DESTRUCT BENIGN LESION, UP TO 14          Today's Medication Changes          These changes are accurate as of 8/14/18  9:03 AM.  If you have any questions, ask your nurse or doctor.               Start taking these medicines.        Dose/Directions    hydrocortisone 1 % cream   Commonly known as:  CORTAID   Used for:  Rash of neck   Started by:  Zamzam Neely, DO        Apply sparingly to affected area three times daily for 14 days.   Quantity:  30 g   Refills:  0            Where to get your medicines      These medications were sent to [facility] - [place], [place] - [address] [place] [address]  [address] [facility] [address], [place] [address]     Phone:  [phone]     hydrocortisone 1 % cream                Primary Care Provider Office Phone # Fax #    Amy Bhagat -[phone]       [facility] [address] [place] [address]        Equal Access to Services     TIMUR QUIROZ AH: Hadii aad ku hadasho Soomaali, waaxda luqadaha, qaybta kaalmada adeegyada, jayjay damon. So [facility] [phone].    ATENCIÓN: Si habla español, tiene a weston disposición servicios gratuitos de asistencia lingüística. Nataly al [phone].    We comply with applicable federal civil rights laws and [place] laws. We do not discriminate on the basis of race, color, national origin, age, disability, sex, sexual orientation, or gender identity.            Thank you!     Thank you for choosing [facility]  for your care. Our goal is always to provide you with excellent care. Hearing back from our patients is one way we can continue to improve " our services. Please take a few minutes to complete the written survey that you may receive in the mail after your visit with us. Thank you!             Your Updated Medication List - Protect others around you: Learn how to safely use, store and throw away your medicines at www.disposemymeds.org.          This list is accurate as of 8/14/18  9:03 AM.  Always use your most recent med list.                   Brand Name Dispense Instructions for use Diagnosis    acetaminophen 325 MG tablet    TYLENOL     Take 650 mg by mouth        hydrocortisone 1 % cream    CORTAID    30 g    Apply sparingly to affected area three times daily for 14 days.    Rash of neck       PLAQUENIL 200 MG tablet   Generic drug:  hydroxychloroquine     60 tablet    Take 200 mg by mouth daily as needed        valACYclovir 1000 mg tablet    VALTREX    4 tablet    Take 2 tablets (2,000 mg) by mouth 2 times daily    Herpes labialis

## 2018-08-14 NOTE — PROCEDURES
Cryotherapy procedure note: After verbal consent and discussion of risks and benefits including but no limited to dyspigmentation/scar, blister, and pain, one was(were) treated with 1-2mm freeze border for 2 cycles with liquid nitrogen on right index finger.    Zamzam Neely DO  [facility] Resident  Pager #[phone]

## 2018-08-14 NOTE — PROGRESS NOTES
"Assessment and Plan   1. Rash of neck: Most likely heat rash given appearance and history. Recommended hydrocortisone, OTC antihistamine for itching, keeping area dry, avoiding heat/sun exposure until resolved.   - hydrocortisone (CORTAID) 1 % cream; Apply sparingly to affected area three times daily for 14 days.  Dispense: 30 g; Refill: 0    2. Viral wart on finger: Cryotherapy performed to right index finger. Discussed she may need to return in 2 weeks for another round of cryo for complete resolution.   - DESTRUCT BENIGN LESION, UP TO 14  Follow up: 2 weeks if needed  Options for treatment and follow-up care were reviewed with the patient and/or guardian. Minnie Her and/or guardian engaged in the decision making process and verbalized understanding of the options discussed and agreed with the final plan.    Zamzam Neely DO  [facility] Resident    Precepted with: Danielle Mcdowell MD           HPI:   Minnie Garg is a 38 year old  Female who presents for:    Neck rash  -rash on neck started week ago  -very itchy  -works in a back kitchen, where its hot  -often gets heat rashes  -has not tried anything for it  -no new lotions, creams, detergents    Wart  -right index finger  -present for past month  -has had warts as a kid  -OTC wart remover doesn't normally work         PMHX:     Patient Active Problem List   Diagnosis     Systemic lupus erythematosus (H)     Anemia, unspecified type     Non morbid obesity due to excess calories     Pap smear for cervical cancer screening     Xerosis cutis       Allergies   Allergen Reactions     No Known Allergies        No results found for this or any previous visit (from the past 24 hour(s)).         Review of Systems:   See HPI.          Physical Exam:     Vitals:    08/14/18 0844   BP: 106/67   Pulse: 65   Temp: 98.3  F (36.8  C)   TempSrc: Oral   SpO2: 99%   Weight: 194 lb 3.2 oz (88.1 kg)   Height: 5' 2\" (157.5 cm)     Body mass index is 35.52 " kg/(m^2).    General: Alert, well-appearing female in NAD  Pulm: CTA BL, no tachypnea  CV: RRR, no murmur  Skin: diffuse slightly erythematous papular rash on nape of neck, papule with core in center on PIP joint of right index finger  Psych: Mood appropriate to visit content, full affect, rational thought content and process

## 2018-08-19 NOTE — PROGRESS NOTES
Preceptor Attestation:   Patient seen, evaluated and discussed with the resident. I was present for and supervised the entire procedure. I have verified the content of the note, which accurately reflects my assessment of the patient and the plan of care.  Supervising Physician:Danielle Mcdowell MD  [facility]

## 2018-09-06 ENCOUNTER — OFFICE VISIT (OUTPATIENT)
Dept: FAMILY MEDICINE | Facility: CLINIC | Age: 38
End: 2018-09-06
Payer: COMMERCIAL

## 2018-09-06 VITALS
HEART RATE: 63 BPM | WEIGHT: 192.8 LBS | OXYGEN SATURATION: 98 % | DIASTOLIC BLOOD PRESSURE: 70 MMHG | HEIGHT: 62 IN | TEMPERATURE: 97.9 F | SYSTOLIC BLOOD PRESSURE: 110 MMHG | BODY MASS INDEX: 35.48 KG/M2

## 2018-09-06 DIAGNOSIS — B07.8 COMMON WART: Primary | ICD-10-CM

## 2018-09-06 PROBLEM — K11.20 SIALADENITIS: Status: ACTIVE | Noted: 2018-05-02

## 2018-09-06 NOTE — PROGRESS NOTES
Preceptor Attestation:   Patient seen, evaluated and discussed with the resident. I was present for and supervised the entire procedure. I have verified the content of the note, which accurately reflects my assessment of the patient and the plan of care.  Supervising Physician:Sheela Almeida MD  [facility]

## 2018-09-06 NOTE — MR AVS SNAPSHOT
"              After Visit Summary   9/6/2018    Minnie Garg    MRN: [id number]           Patient Information     Date Of Birth          1980        Visit Information        Provider Department      9/6/2018 9:40 AM Gavi Snow MD [facility]        Today's Diagnoses     Common wart    -  1       Follow-ups after your visit        Who to contact     Please call your clinic at [phone] to:    Ask questions about your health    Make or cancel appointments    Discuss your medicines    Learn about your test results    Speak to your doctor            Additional Information About Your Visit        MyChart Information     [employer] gives you secure access to your electronic health record. If you see a primary care provider, you can also send messages to your care team and make appointments. If you have questions, please call your primary care clinic.  If you do not have a primary care provider, please call [phone] and they will assist you.      [employer] is an electronic gateway that provides easy, online access to your medical records. With [employer], you can request a clinic appointment, read your test results, renew a prescription or communicate with your care team.     To access your existing account, please contact your [facility] or call [phone] for assistance.        Care EveryWhere ID     This is your Care EveryWhere ID. This could be used by other organizations to access your [facility] medical records  [id number]        Your Vitals Were     Pulse Temperature Height Pulse Oximetry BMI (Body Mass Index)       63 97.9  F (36.6  C) (Oral) 5' 2\" (157.5 cm) 98% 35.26 kg/m2        Blood Pressure from Last 3 Encounters:   09/06/18 110/70   08/14/18 106/67   04/30/18 120/82    Weight from Last 3 Encounters:   09/06/18 192 lb 12.8 oz (87.5 kg)   08/14/18 194 lb 3.2 oz (88.1 kg)   04/30/18 196 lb (88.9 kg)              We Performed the Following     DESTRUCT " BENIGN LESION, UP TO 14        Primary Care Provider Office Phone # Fax #    Amy Bhagat [phone]       [facility] [address] [place] [address]        Equal Access to Services     REAGANAYLIN GABRIELLA : Hadii aad ku hadasho Soomaali, waaxda luqadaha, qaybta kaalmada adeegyada, waxran idiin haygavinn adechaz woodruff laHarmonymalorie damon. So [facility] [phone].    ATENCIÓN: Si habla español, tiene a weston disposición servicios gratuitos de asistencia lingüística. Llame al [phone].    We comply with applicable federal civil rights laws and [place] laws. We do not discriminate on the basis of race, color, national origin, age, disability, sex, sexual orientation, or gender identity.            Thank you!     Thank you for choosing [facility]  for your care. Our goal is always to provide you with excellent care. Hearing back from our patients is one way we can continue to improve our services. Please take a few minutes to complete the written survey that you may receive in the mail after your visit with us. Thank you!             Your Updated Medication List - Protect others around you: Learn how to safely use, store and throw away your medicines at www.disposemymeds.org.          This list is accurate as of 9/6/18 11:30 AM.  Always use your most recent med list.                   Brand Name Dispense Instructions for use Diagnosis    acetaminophen 325 MG tablet    TYLENOL     Take 650 mg by mouth        hydrocortisone 1 % cream    CORTAID    30 g    Apply sparingly to affected area three times daily for 14 days.    Rash of neck       PLAQUENIL 200 MG tablet   Generic drug:  hydroxychloroquine     60 tablet    Take 200 mg by mouth daily as needed

## 2018-09-06 NOTE — PROGRESS NOTES
"       EDGARDO       Minnie Her is a 38 year old female who presents for:  Chief Complaint   Patient presents with     Derm Problem     Wart removal - left index finger     Medication Reconciliation     completed       Wart  - left second digit  - has been present for ~2 months  - has a hx of warts on fingers at younger age, none recently  - received cryotherapy on 8/14; layer of skin did peel off afterwards.    ROS: Complete 6 point ROS completed and negative other than stated above.    Social: works in restaurant industry         Physical Exam:     Vitals:    09/06/18 0944   BP: 110/70   Pulse: 63   Temp: 97.9  F (36.6  C)   TempSrc: Oral   SpO2: 98%   Weight: 192 lb 12.8 oz (87.5 kg)   Height: 5' 2\" (157.5 cm)     Body mass index is 35.26 kg/(m^2).  Vitals were reviewed and were normal    General: Alert and orientated in NAD. Pleasant and cooperative.   Cardio: Extremities warm and well-perfused  Resp: No increased work of breathing  Skin: on PIP of left second finger there is a common wart.  Psych: mood good, affect congruent    3 rounds of cryotherapy were performed with 1mm surrounding freezing and thawing in between.    Assessment and Plan     1. Common wart  Wart present on left second finger at PIP joint. This is second cryotherapy session. Considered topical cream, but patient works in [employer] industry and having multiple hand-washings a day to cryotherapy was again performed today.  - DESTRUCT BENIGN LESION, UP TO 14  - follow up in 3 weeks if it has not resolved    Options for treatment and follow-up care were reviewed with the patient. Minnie Her  engaged in the decision making process and verbalized understanding of the options discussed and agreed with the final plan.    Precepted with: MD Gavi Fair MD (PGY3)  Pager: [phone]  [facility] Resident          "

## 2018-09-26 ENCOUNTER — OFFICE VISIT (OUTPATIENT)
Dept: FAMILY MEDICINE | Facility: CLINIC | Age: 38
End: 2018-09-26
Payer: COMMERCIAL

## 2018-09-26 VITALS
SYSTOLIC BLOOD PRESSURE: 103 MMHG | DIASTOLIC BLOOD PRESSURE: 70 MMHG | TEMPERATURE: 98.3 F | HEIGHT: 63 IN | BODY MASS INDEX: 34.12 KG/M2 | WEIGHT: 192.6 LBS | OXYGEN SATURATION: 96 % | HEART RATE: 60 BPM

## 2018-09-26 DIAGNOSIS — B96.89 BV (BACTERIAL VAGINOSIS): Primary | ICD-10-CM

## 2018-09-26 DIAGNOSIS — B00.1 HERPES LABIALIS: ICD-10-CM

## 2018-09-26 DIAGNOSIS — N76.0 BV (BACTERIAL VAGINOSIS): Primary | ICD-10-CM

## 2018-09-26 LAB — HIV 1+2 AB+HIV1 P24 AG SERPL QL IA: NEGATIVE

## 2018-09-26 RX ORDER — FLUCONAZOLE 150 MG/1
150 TABLET ORAL ONCE
Qty: 1 TABLET | Refills: 0 | Status: SHIPPED | OUTPATIENT
Start: 2018-09-26 | End: 2018-09-26

## 2018-09-26 RX ORDER — VALACYCLOVIR HYDROCHLORIDE 1 G/1
2000 TABLET, FILM COATED ORAL 2 TIMES DAILY
Qty: 4 TABLET | Refills: 0 | Status: SHIPPED | OUTPATIENT
Start: 2018-09-26 | End: 2019-06-25

## 2018-09-26 NOTE — PROGRESS NOTES
"  EDGARDO    Minnie Her is 38 year old yo female presenting for:    1. Repeat cryotherapy   - wart on left second digit which improved after first round of cryotherapy  - now here for repeat     OBJECTIVE    Vitals  /70  Pulse 60  Temp 98.3  F (36.8  C) (Oral)  Ht 5' 2.6\" (159 cm)  Wt 192 lb 9.6 oz (87.4 kg)  SpO2 96%  BMI 34.56 kg/m2    Physical Exam  General: No acute distress  CV: RRR  Respiratory: CTA bilaterally, no wheezes/rhonchi/rhales appreciated, no respiratory distress  Skin: common wart on PIP of left second finger    ASSESSMENT/PLAN    # Common wart, left hand second finger  - 3 rounds of cryothrapy today x3 with 1 minute thawing time between each round     #H/o recurrent HSV 1  - patient requesting rx of antiviral medication  - patient will be traveling for next several months and would like medication available just in case  - valtrex rx given     #H/o recurrent candidal vagnitis  - patient would also like medication for yeast infection   - difulcan rx given         Precepted with Dr. Thompson     "

## 2018-09-26 NOTE — MR AVS SNAPSHOT
"              After Visit Summary   9/26/2018    Minnie Garg    MRN: [id number]           Patient Information     Date Of Birth          1980        Visit Information        Provider Department      9/26/2018 9:00 AM Palla, Misbah Yousuf, MD [facility]        Today's Diagnoses     BV (bacterial vaginosis)    -  1    Herpes labialis           Follow-ups after your visit        Who to contact     Please call your clinic at [phone] to:    Ask questions about your health    Make or cancel appointments    Discuss your medicines    Learn about your test results    Speak to your doctor            Additional Information About Your Visit        MyChart Information     Nuroa gives you secure access to your electronic health record. If you see a primary care provider, you can also send messages to your care team and make appointments. If you have questions, please call your primary care clinic.  If you do not have a primary care provider, please call [phone] and they will assist you.      Nuroa is an electronic gateway that provides easy, online access to your medical records. With Nuroa, you can request a clinic appointment, read your test results, renew a prescription or communicate with your care team.     To access your existing account, please contact your [facility] or call [phone] for assistance.        Care EveryWhere ID     This is your Care EveryWhere ID. This could be used by other organizations to access your [facility] medical records  [id number]        Your Vitals Were     Pulse Temperature Height Pulse Oximetry BMI (Body Mass Index)       60 98.3  F (36.8  C) (Oral) 5' 2.6\" (159 cm) 96% 34.56 kg/m2        Blood Pressure from Last 3 Encounters:   09/26/18 103/70   09/06/18 110/70   08/14/18 106/67    Weight from Last 3 Encounters:   09/26/18 192 lb 9.6 oz (87.4 kg)   09/06/18 192 lb 12.8 oz (87.5 kg)   08/14/18 194 lb 3.2 oz (88.1 kg)         "      We Performed the Following     HIV Ag/Ab Screen [place] [facility])          Today's Medication Changes          These changes are accurate as of 9/26/18  9:27 AM.  If you have any questions, ask your nurse or doctor.               Start taking these medicines.        Dose/Directions    fluconazole 150 MG tablet   Commonly known as:  DIFLUCAN   Used for:  BV (bacterial vaginosis), Herpes labialis   Started by:  Palla, Misbah Yousuf, MD        Dose:  150 mg   Take 1 tablet (150 mg) by mouth once for 1 dose   Quantity:  1 tablet   Refills:  0       valACYclovir 1000 mg tablet   Commonly known as:  VALTREX   Used for:  Herpes labialis, BV (bacterial vaginosis)   Started by:  Palla, Misbah Yousuf, MD        Dose:  2000 mg   Take 2 tablets (2,000 mg) by mouth 2 times daily for 1 day   Quantity:  4 tablet   Refills:  0            Where to get your medicines      These medications were sent to [facility] - [place], [place] - [address]  [address] [facility] [address], [place] [address]     Phone:  [phone]     fluconazole 150 MG tablet    valACYclovir 1000 mg tablet                Primary Care Provider Office Phone # Fax #    Amy Leslye Bhagat [phone]       [facility] [address] [place] [address]        Equal Access to Services     TIMUR QUIROZ AH: Hadii nic ku hadasho Soomaali, waaxda luqadaha, qaybta kaalmada adeegyada, waxay radhain hayaan kassi damon. So [facility] [phone].    ATENCIÓN: Si habla español, tiene a weston disposición servicios gratuitos de asistencia lingüística. Llame al [phone].    We comply with applicable federal civil rights laws and [place] laws. We do not discriminate on the basis of race, color, national origin, age, disability, sex, sexual orientation, or gender identity.            Thank you!     Thank you for choosing [facility]  for your care. Our goal is always to provide you with excellent care.  Hearing back from our patients is one way we can continue to improve our services. Please take a few minutes to complete the written survey that you may receive in the mail after your visit with us. Thank you!             Your Updated Medication List - Protect others around you: Learn how to safely use, store and throw away your medicines at www.disposemymeds.org.          This list is accurate as of 9/26/18  9:27 AM.  Always use your most recent med list.                   Brand Name Dispense Instructions for use Diagnosis    acetaminophen 325 MG tablet    TYLENOL     Take 650 mg by mouth        fluconazole 150 MG tablet    DIFLUCAN    1 tablet    Take 1 tablet (150 mg) by mouth once for 1 dose    BV (bacterial vaginosis), Herpes labialis       hydrocortisone 1 % cream    CORTAID    30 g    Apply sparingly to affected area three times daily for 14 days.    Rash of neck       PLAQUENIL 200 MG tablet   Generic drug:  hydroxychloroquine     60 tablet    Take 200 mg by mouth daily as needed        valACYclovir 1000 mg tablet    VALTREX    4 tablet    Take 2 tablets (2,000 mg) by mouth 2 times daily for 1 day    Herpes labialis, BV (bacterial vaginosis)

## 2018-10-03 NOTE — PROGRESS NOTES
Preceptor Attestation:   Patient seen, evaluated and discussed with the resident, Dr.Misbah Palla. I was present for and supervised the entire procedure. I have verified the content of the note, which accurately reflects my assessment of the patient and the plan of care.  Supervising Physician:Prabha Thompson MD  [facility]

## 2018-11-15 ENCOUNTER — OFFICE VISIT (OUTPATIENT)
Dept: FAMILY MEDICINE | Facility: CLINIC | Age: 38
End: 2018-11-15
Payer: COMMERCIAL

## 2018-11-15 ENCOUNTER — AMBULATORY - HEALTHEAST (OUTPATIENT)
Dept: ADMINISTRATIVE | Facility: REHABILITATION | Age: 38
End: 2018-11-15

## 2018-11-15 VITALS
RESPIRATION RATE: 18 BRPM | WEIGHT: 196 LBS | SYSTOLIC BLOOD PRESSURE: 115 MMHG | DIASTOLIC BLOOD PRESSURE: 72 MMHG | BODY MASS INDEX: 34.73 KG/M2 | OXYGEN SATURATION: 98 % | HEIGHT: 63 IN | HEART RATE: 61 BPM | TEMPERATURE: 98.3 F

## 2018-11-15 DIAGNOSIS — G89.29 CHRONIC PAIN OF BOTH SHOULDERS: ICD-10-CM

## 2018-11-15 DIAGNOSIS — M25.511 CHRONIC PAIN OF BOTH SHOULDERS: ICD-10-CM

## 2018-11-15 DIAGNOSIS — M25.512 CHRONIC PAIN OF BOTH SHOULDERS: ICD-10-CM

## 2018-11-15 DIAGNOSIS — B07.8 COMMON WART: Primary | ICD-10-CM

## 2018-11-15 DIAGNOSIS — M32.9 SYSTEMIC LUPUS ERYTHEMATOSUS (H): ICD-10-CM

## 2018-11-15 DIAGNOSIS — M32.9 SYSTEMIC LUPUS ERYTHEMATOSUS, UNSPECIFIED SLE TYPE, UNSPECIFIED ORGAN INVOLVEMENT STATUS (H): ICD-10-CM

## 2018-11-15 RX ORDER — ACETAMINOPHEN 500 MG
1000 TABLET ORAL 2 TIMES DAILY PRN
Qty: 100 TABLET | Refills: 11 | Status: SHIPPED | OUTPATIENT
Start: 2018-11-15

## 2018-11-15 NOTE — PROGRESS NOTES
I have personally reviewed the history and examination as documented by Dr. Snow.  I was present during key portions of the visit and agree with the assessment and plan as documented for 38 yr old female with lupus here for recurrent wart on finger, chronic bilateral shoulder pain.  Cryotherapy performed today and I was present for the entire procedure.  Will send to PT for shoulder pain. Precautions given. Anticipatory guidance given.     Vinicio Ramírez MD  November 15, 2018  2:30 PM

## 2018-11-15 NOTE — MR AVS SNAPSHOT
After Visit Summary   11/15/2018    Minnie Garg    MRN: [id number]           Patient Information     Date Of Birth          1980        Visit Information        Provider Department      11/15/2018 9:20 AM Gavi Snow MD [facility]        Today's Diagnoses     Common wart    -  1    Systemic lupus erythematosus, unspecified SLE type, unspecified organ involvement status (H)        Chronic pain of both shoulders          Care Instructions    Referral for ( TEST )  :      Dermatology   LOCATION/PLACE/Provider :    [facility]  [place]   DATE & TIME :     12- at 8:50am  PHONE :     [phone]  FAX :     [phone]  Appointment made by clinic staff/:    Makeda            Follow-ups after your visit        Additional Services     DERMATOLOGY REFERRAL       Patient to stop at the [facility]    Reason for Referral: Common wart not responding to cryotherapy x 4     needed: No  Language: English    May leave message on voicemail: Yes    ([facility]) Referral should be tracked (Yes/No)? Yes            PHYSICAL THERAPY REFERRAL       PT/OT REFERRAL  Minnie Garg  1980  Phone #: [phone] (home)    needed: No  Language: English    PT/OT  Facility:   Per patient preference    History: hx of lupus and with bilateral shoulder pain    Precautions/Contraindications: None    Imaging Studies: None    Surgical Procedure/Test Results: None    Treatment Goals:   Increase: Flexibility, Strength and ROM  Decrease: Edema, Pain and Spasm    Prognosis: excellent    Visits: Up to 12    Evaluate: Evaluate and treat    Plan: Manual Therapy, Flexibility Exercise and Strength Exercise                  Who to contact     Please call your clinic at [phone] to:    Ask questions about your health    Make or cancel appointments    Discuss your medicines    Learn about your test results    Speak to your doctor            Additional Information  "About Your Visit        [employer] Information     [employer]Before gives you secure access to your electronic health record. If you see a primary care provider, you can also send messages to your care team and make appointments. If you have questions, please call your primary care clinic.  If you do not have a primary care provider, please call [phone] and they will assist you.      [employer]riveBefore is an electronic gateway that provides easy, online access to your medical records. With [employer]Before, you can request a clinic appointment, read your test results, renew a prescription or communicate with your care team.     To access your existing account, please contact your [facility] or call [phone] for assistance.        Care EveryWhere ID     This is your Care EveryWhere ID. This could be used by other organizations to access your [facility] medical records  [id number]        Your Vitals Were     Pulse Temperature Respirations Height Pulse Oximetry BMI (Body Mass Index)    61 98.3  F (36.8  C) 18 5' 2.5\" (158.8 cm) 98% 35.28 kg/m2       Blood Pressure from Last 3 Encounters:   11/15/18 115/72   09/26/18 103/70   09/06/18 110/70    Weight from Last 3 Encounters:   11/15/18 196 lb (88.9 kg)   09/26/18 192 lb 9.6 oz (87.4 kg)   09/06/18 192 lb 12.8 oz (87.5 kg)              We Performed the Following     DERMATOLOGY REFERRAL     DESTRUCT BENIGN LESION, UP TO 14     PHYSICAL THERAPY REFERRAL          Today's Medication Changes          These changes are accurate as of 11/15/18  2:30 PM.  If you have any questions, ask your nurse or doctor.               Start taking these medicines.        Dose/Directions    salicylic acid 17 % Liqd   Used for:  Common wart   Started by:  Gavi Snow MD        Externally apply topically 2 times daily   Quantity:  15 mL   Refills:  3         These medicines have changed or have updated prescriptions.        Dose/Directions    acetaminophen 500 MG tablet "   Commonly known as:  TYLENOL   This may have changed:    - medication strength  - how much to take  - when to take this  - reasons to take this   Used for:  Chronic pain of both shoulders   Changed by:  Gavi Snow MD        Dose:  1000 mg   Take 2 tablets (1,000 mg) by mouth 2 times daily as needed for mild pain   Quantity:  100 tablet   Refills:  11            Where to get your medicines      These medications were sent to [facility] - [place], [place] - [address]  [address], [place] [address]     Phone:  [phone]     acetaminophen 500 MG tablet    salicylic acid 17 % Liqd                Primary Care Provider Office Phone # Fax #    Amy Bhagat [phone]       [facility] [address] [place] [address]        Equal Access to Services     TIMUR QUIROZ : Hadii aad ku hadasho Soomaali, waaxda luqadaha, qaybta kaalmada adeegyada, waxay idiin hayaan adechaz khyoel talbot . So [facility] [phone].    ATENCIÓN: Si habla español, tiene a weston disposición servicios gratuitos de asistencia lingüística. [facility] [phone].    We comply with applicable federal civil rights laws and [place] laws. We do not discriminate on the basis of race, color, national origin, age, disability, sex, sexual orientation, or gender identity.            Thank you!     Thank you for choosing [facility]  for your care. Our goal is always to provide you with excellent care. Hearing back from our patients is one way we can continue to improve our services. Please take a few minutes to complete the written survey that you may receive in the mail after your visit with us. Thank you!             Your Updated Medication List - Protect others around you: Learn how to safely use, store and throw away your medicines at www.disposemymeds.org.          This list is accurate as of 11/15/18  2:30 PM.  Always use your most recent med list.                    Brand Name Dispense Instructions for use Diagnosis    acetaminophen 500 MG tablet    TYLENOL    100 tablet    Take 2 tablets (1,000 mg) by mouth 2 times daily as needed for mild pain    Chronic pain of both shoulders       PLAQUENIL 200 MG tablet   Generic drug:  hydroxychloroquine     60 tablet    Take 200 mg by mouth daily as needed        salicylic acid 17 % Liqd     15 mL    Externally apply topically 2 times daily    Common wart       valACYclovir 1000 mg tablet    VALTREX    4 tablet    Take 2 tablets (2,000 mg) by mouth 2 times daily for 1 day    Herpes labialis, BV (bacterial vaginosis)

## 2018-11-15 NOTE — PROGRESS NOTES
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    Bilateral Shoulder Pain  -she states her pain has been constant for 2-3 months, worse at night and while lifting plates and trays at night  -she describes her pain as similar to when \"you lose blood flow to an area\" tingling, aching, and feeling a little better with massage and pressure  -some occasional tylenol use, no heat pads or cold packs  -saw rheumatologist a few months ago and working on getting another appointment   -looking for another rheumatologist because it has been difficult to see her current provider (She has been with her for 2 years, seeing her q6 months at [facility])  -Her rheumatology suggested PT and if that provided no benefit they would suggest imaging         PMHX:     Patient Active Problem List   Diagnosis     Systemic lupus erythematosus (H)     Anemia, unspecified type     Non morbid obesity due to excess calories     Pap smear for cervical cancer screening     Xerosis cutis     Sialadenitis       Current Outpatient Prescriptions   Medication Sig Dispense Refill     acetaminophen (TYLENOL) 500 MG tablet Take 2 tablets (1,000 mg) by mouth 2 times daily as needed for mild pain 100 tablet 11     hydroxychloroquine (PLAQUENIL) 200 MG tablet Take 200 mg by mouth daily as needed  60 tablet 1     salicylic acid 17 % LIQD Externally apply topically 2 times daily 15 mL 3     valACYclovir (VALTREX) 1000 mg tablet Take 2 tablets (2,000 mg) by mouth 2 times " "daily for 1 day 4 tablet 0       Social History     Social History     Marital status: Single     Spouse name: N/A     Number of children: N/A     Years of education: N/A     Occupational History     Not on file.     Social History Main Topics     Smoking status: Never Smoker     Smokeless tobacco: Never Used     Alcohol use 0.0 oz/week     0 Standard drinks or equivalent per week      Comment: Occasionally.     Drug use: No     Sexual activity: Not on file     Other Topics Concern     Not on file     Social History Narrative          Allergies   Allergen Reactions     No Known Allergies        No results found for this or any previous visit (from the past 24 hour(s)).         Review of Systems:     10 point review of systems negative except for noted in HPI             Physical Exam:     Vitals:    11/15/18 0910   BP: 115/72   Pulse: 61   Resp: 18   Temp: 98.3  F (36.8  C)   SpO2: 98%   Weight: 196 lb (88.9 kg)   Height: 5' 2.5\" (158.8 cm)     Body mass index is 35.28 kg/(m^2).    Exam:  Constitutional: healthy, alert and no distress  Cardiovascular: Regular rate and rhythm. No murmurs, clicks gallops or rub  Respiratory: Lungs clear to auscultation. No wheezing or crackles present   Musculoskeletal: Extremities normal- no gross deformities noted and normal muscle tone, negative empty can test, negative Neer's. Positive Hawkin's tests bilaterally L>R.  Skin: 1 cm wart on PIP of index finger, slightly erythematous and tender to palpation  Psychiatric: mentation appears normal and affect normal/bright    Procedure:  Cryotherapy was performed on finger wart. 3 rounds performed with complete thawing in between and 2mm ring of white during cycles. She tolerated the procedure well. No complications.      Assessment and Plan   1. Left 2nd PIP Wart  -hx of cyrotherapy x3, no OTC topical medication use  -Shaved wart and provided 4th cyrotherapy today  -prescription for salicylic acid (max strength) gel given today  -Should " this provide no benefit, dermatology referral given    2. Bilateral Shoulder Pain  -likely related to repetitive heavy lifting at her work, positive Hawkin's exam bilaterally suggesting nerve impingement pain  -recommend scheduling first available appointment with current rheumatologist (discussed referral to anther provider but stated it would likely be the same wait time)   -Referral to PT given  -recommend scheduled tylenol use, 1000 mg BID or TID, prescription given    Options for treatment and follow-up care were reviewed with the patient and/or guardian. Minnie Her and/or guardian engaged in the decision making process and verbalized understanding of the options discussed and agreed with the final plan.    Tameka Rios, MS3    Precepted today with: Vinicio Ramírez MD    In supervising the medical student, I saw and evaluated the patient with the student and personally performed all aspects of the history and physical.  I have reviewed and verified that the documentation is correct and complete.    Gavi Snow MD (PGY3)  Pager: [phone]  [facility] Resident

## 2018-11-15 NOTE — PATIENT INSTRUCTIONS
Referral for ( TEST )  :      Dermatology   LOCATION/PLACE/Provider :    [facility]  [place]   DATE & TIME :     12- at 8:50am  PHONE :     [phone]  FAX :     [phone]  Appointment made by clinic staff/:    Makeda

## 2018-12-12 ENCOUNTER — TRANSFERRED RECORDS (OUTPATIENT)
Dept: HEALTH INFORMATION MANAGEMENT | Facility: CLINIC | Age: 38
End: 2018-12-12

## 2019-05-22 ENCOUNTER — OFFICE VISIT (OUTPATIENT)
Dept: FAMILY MEDICINE | Facility: CLINIC | Age: 39
End: 2019-05-22
Payer: COMMERCIAL

## 2019-05-22 VITALS
BODY MASS INDEX: 36.8 KG/M2 | WEIGHT: 200 LBS | RESPIRATION RATE: 20 BRPM | TEMPERATURE: 98.1 F | OXYGEN SATURATION: 97 % | HEIGHT: 62 IN | HEART RATE: 51 BPM

## 2019-05-22 DIAGNOSIS — B37.2 CANDIDIASIS OF SKIN: ICD-10-CM

## 2019-05-22 DIAGNOSIS — R19.8 UMBILICUS DISCHARGE: Primary | ICD-10-CM

## 2019-05-22 RX ORDER — NYSTATIN 100000 U/G
OINTMENT TOPICAL 2 TIMES DAILY
Qty: 30 G | Refills: 0 | Status: SHIPPED | OUTPATIENT
Start: 2019-05-22 | End: 2019-06-25

## 2019-05-22 ASSESSMENT — MIFFLIN-ST. JEOR: SCORE: 1542.44

## 2019-05-22 NOTE — PROGRESS NOTES
Preceptor Attestation:   Patient seen, evaluated and discussed with the resident. I have verified the content of the note, which accurately reflects my assessment of the patient and the plan of care.  Supervising Physician:Luis Carrasco MD  [facility]

## 2019-05-22 NOTE — PROGRESS NOTES
"Chief Complaint   Patient presents with     Pain     Pain near navel for the past two weeks with discharge     S:  Minnie Garg is a 39 year old year old female who  has a past medical history of Lupus. who presents to discuss:    1. Belly button concern  \"I think it might be infected\"  -2 day history of redness, drainage/crusting  -cleaned it out yesterday and thinks its much improved today  -no fevers/chills, no spreading redness  -has sensitive skin, some dry patches in the winter but no other rashes right now.    Complete ROS otherwise negative.     Past Medical History:   Diagnosis Date     Lupus      Past Surgical History:   Procedure Laterality Date     NO HISTORY OF SURGERY       Family History   Problem Relation Age of Onset     Diabetes Mother      Hypertension Mother      Asthma Sister      Coronary Artery Disease No family hx of      Breast Cancer No family hx of      Colon Cancer No family hx of      Prostate Cancer No family hx of      Other Cancer No family hx of      Social History     Socioeconomic History     Marital status: Single     Spouse name: Not on file     Number of children: Not on file     Years of education: Not on file     Highest education level: Not on file   Occupational History     Not on file   Social Needs     Financial resource strain: Not on file     Food insecurity:     Worry: Not on file     Inability: Not on file     Transportation needs:     Medical: Not on file     Non-medical: Not on file   Tobacco Use     Smoking status: Never Smoker     Smokeless tobacco: Never Used   Substance and Sexual Activity     Alcohol use: Yes     Alcohol/week: 0.0 oz     Comment: Occasionally.     Drug use: No     Sexual activity: Not on file   Lifestyle     Physical activity:     Days per week: Not on file     Minutes per session: Not on file     Stress: Not on file   Relationships     Social connections:     Talks on phone: Not on file     Gets together: Not on file     Attends Sikhism " "service: Not on file     Active member of club or organization: Not on file     Attends meetings of clubs or organizations: Not on file     Relationship status: Not on file     Intimate partner violence:     Fear of current or ex partner: Not on file     Emotionally abused: Not on file     Physically abused: Not on file     Forced sexual activity: Not on file   Other Topics Concern     Parent/sibling w/ CABG, MI or angioplasty before 65F 55M? Not Asked   Social History Narrative     Not on file       O:  Vitals: Pulse 51   Temp 98.1  F (36.7  C) (Oral)   Resp 20   Ht 1.57 m (5' 1.81\")   Wt 90.7 kg (200 lb)   LMP 05/21/2019   SpO2 97%   BMI 36.80 kg/m      General: Alert, well-appearing, no acute distress  HEENT: PERRL, moist oral mucus membranes  Pulm: clear to auscultation bilaterally, no tachypnea  CV: RRR, no murmur  Abd: soft, non-tender, non-distended, no masses, no guarding no rebound  Ext: Warm, well perfused, no LE edema  Skin: No rash on limited skin exam  Psych: Mood appropriate to visit content, full affect, rational thought content and process      A/P:  1. Umbilicus discharge  -emphazised to keep dry, use a little vaseline for the irritation    2. Candidiasis of skin  If not improving with vaseline trial antifungal.   - nystatin (MYCOSTATIN) 280491 UNIT/GM external ointment; Apply topically 2 times daily  Dispense: 30 g; Refill: 0      Alfa Kraft MD  [facility] Medicine Resident  Pager     Precepted with: Luis Carrasco MD  "

## 2019-06-25 ENCOUNTER — TELEPHONE (OUTPATIENT)
Dept: FAMILY MEDICINE | Facility: CLINIC | Age: 39
End: 2019-06-25

## 2019-06-25 ENCOUNTER — OFFICE VISIT (OUTPATIENT)
Dept: FAMILY MEDICINE | Facility: CLINIC | Age: 39
End: 2019-06-25
Payer: COMMERCIAL

## 2019-06-25 VITALS
SYSTOLIC BLOOD PRESSURE: 115 MMHG | HEART RATE: 79 BPM | BODY MASS INDEX: 38.09 KG/M2 | WEIGHT: 207 LBS | HEIGHT: 62 IN | OXYGEN SATURATION: 94 % | TEMPERATURE: 98.7 F | DIASTOLIC BLOOD PRESSURE: 81 MMHG

## 2019-06-25 DIAGNOSIS — B00.1 HERPES LABIALIS: ICD-10-CM

## 2019-06-25 DIAGNOSIS — J06.9 VIRAL URI WITH COUGH: Primary | ICD-10-CM

## 2019-06-25 DIAGNOSIS — N76.0 BV (BACTERIAL VAGINOSIS): ICD-10-CM

## 2019-06-25 DIAGNOSIS — B96.89 BV (BACTERIAL VAGINOSIS): ICD-10-CM

## 2019-06-25 RX ORDER — VALACYCLOVIR HYDROCHLORIDE 1 G/1
2000 TABLET, FILM COATED ORAL 2 TIMES DAILY
Qty: 4 TABLET | Refills: 0 | Status: SHIPPED | OUTPATIENT
Start: 2019-06-25

## 2019-06-25 RX ORDER — IBUPROFEN 200 MG
200 TABLET ORAL EVERY 4 HOURS PRN
Qty: 60 TABLET | Refills: 0 | Status: SHIPPED | OUTPATIENT
Start: 2019-06-25

## 2019-06-25 ASSESSMENT — MIFFLIN-ST. JEOR: SCORE: 1577.2

## 2019-06-25 NOTE — PATIENT INSTRUCTIONS
"Tips to feeling better from your viral infection:  - drink plenty of fluids (aim for at least 64 ounces, or 8 cups, of water every day). If you are not well hydrated, you will feel worse.  - muscle aches, headache, sore throat: take tylenol or ibuprofen every 6 hours as needed. A warm bath or shower can also be relaxing  - congestion: use nasal saline, such as a nasal spray bottle ($1-2, a few sprays several times per day), \"NeilMed Sinus Rinse\" (about $10), or neti pot (about $10). Use bottled water or boiled, filtered water in a neti pot or sinus rinse.   - cough: warm fluids (tea, broth) and honey have been shown to work just as well as cough medicine.   - wash hands frequently to prevent spreading germs to others, and to protect yourself from getting another infection while your immune system is already working hard to fight this infection    For the next 2 days, take acetaminophen 1000 mg every 6 hours. If you are still miserable between doses, take ibuprofen 600 mg every 6-8 hours as needed. If you only take this for a few days and are drinking fluids, it is safe.     In July, set up an appointment with primary care and get a referral for rheumatology in the Fall.   "

## 2019-06-25 NOTE — PROGRESS NOTES
"Eliseo   Minnie Her is a 39 year old female presenting for:  Pharyngitis (sore throat, cough, fever)    chill this morning. No fever/sweat.   Sore throat since yesterday. Fatigue today. Some cough is bothersome.   Just got  and moving tomorrow. Traveling to [place] tomorrow permanently.   Feeling really miserable and wants prednisone.   2 years ago, she felt similar and was miserable for 2 weeks; she tried several meds including guaifenesin with codeine, tessalon perles, several OTC meds, lidocaine. Had parotitis last year - not sore now.    Tried theraflu powder and not very helpful. Worried about stomach bleeding with nsaids though never happened to her.     Follows with rheum for lupus, but moving permanently now.     Had abx for uti once and requesting prn dose for travel.     The patient speaks English, so no  was used for this visit.   Medical and social history and medications reviewed with patient.   Exam   /81   Pulse 79   Temp 98.7  F (37.1  C) (Oral)   Ht 1.575 m (5' 2\")   Wt 93.9 kg (207 lb)   HC 16 cm (6.3\")   SpO2 94%   BMI 37.86 kg/m    Gen: NAD  HEENT: oral mucosa moist,   Card: RRR, no murmurs  Resp: good breath sounds, no wheezing or crackles  Skin: no rashes on exposed skin  Neuro: mentation intact, speech normal  Psych: mood normal, affect congruent  Medical Decision-Making     1. Viral URI with cough  No evidence of recurrent parotitis or other salivary gland inflammation. No lymphadenopathy of cervix. Oral pharynx clear without erythema or exudate. No wheezing on lung exam. Reviewed prior records from 2 year ago. She really wants prednisone; discussed risks associated with prednisone at length and I do not feel that it would be very beneficial to her. Furthermore, she is already overweight and at risk for longterm steroids with her underlying lupus.   - ibuprofen (ADVIL/MOTRIN) 200 MG tablet; Take 1 tablet (200 mg) by mouth every 4 hours as needed for mild pain  " Dispense: 60 tablet; Refill: 0; counseled she can take 600 mg q6h for the next few days as needed  - tylenol 1000 mg q6h for the next 2 days scheduled    If develops UTI symptoms, recommended seeing doctor in [place].   Should develop care in [place] within the next month in any case and request referral to establish care locally with endocrinology for rheumatology.     2. Herpes labialis  Refilled med  - valACYclovir (VALTREX) 1000 mg tablet; Take 2 tablets (2,000 mg) by mouth 2 times daily  Dispense: 4 tablet; Refill: 0    Follow up: ojhn Christensen MD, MPH  [facility] Medicine Resident     Precepted patient with Sarah Lambert MD    Options for treatment and follow-up care were reviewed with the patient and/or guardian. Minnie Her and/or guardian engaged in the decision making process and verbalized understanding of the options discussed and agreed with the final plan.

## 2019-06-25 NOTE — TELEPHONE ENCOUNTER
Spoke with Minnie, c/o coughing with itchy, sore throat. Requesting Prednisone to treat symptoms before getting worse. She's received this treatment in the past which worked very well for symptoms. Recommend Minnie be seen for further evaluation to ensure this treatment is appropriate. An appt has been scheduled. Ben PATTERSON

## 2019-06-25 NOTE — TELEPHONE ENCOUNTER
[facility] phone call message- medication clarification/question:    Full Medication Name: predniSONE (DELTASONE)   Dose: 20 MG tablet     Question: Patient states she would like to request this medication be sent to her pharmacy. She states she previously took this medication before for the same symptoms she is currently experiencing. She states she has a sore throat and a sharp cough. Please call and advise.     Pharmacy confirmed as [facility] - [place], [place] - [address]: Yes    OK to leave a message on voice mail? Yes    Primary language: English      needed? No    Call taken on June 25, 2019 at 11:36 AM by Vaishali Lopes

## 2019-06-28 ENCOUNTER — TELEPHONE (OUTPATIENT)
Dept: FAMILY MEDICINE | Facility: CLINIC | Age: 39
End: 2019-06-28

## 2019-06-28 NOTE — TELEPHONE ENCOUNTER
Minnie was seen in clinic 6/25/2019 for URI-viral. Hx- parotid swelling and also was referred in the past to see ENT, 4/2018. Minnie states she has seen Dr Bhagat for this concern and would like to have this message sent to Dr Bhagat for review and further advise. Last night, onset of left side,neck/throat swelling spreading upward pass jaw line to cheek. No redness or warmth to touch. Tender to touch. No fever/ chills. No further clinic appts available as she initially had called to try and schedule appt. Request this encounter be sent to Dr Bhagat. Thank you. Ben PATTERSON

## 2019-06-28 NOTE — TELEPHONE ENCOUNTER
[facility] phone call message-patient reporting a symptom:     Symptom: swollen glands    Same Day Visit Offered: Called to make an appt but no openings, called hung up as she states she will try a different clinic but she called back again to leave a message.    Additional comments: Patient called back and asked if Dr. Bhagat was in, told her she is in clinic today. She states she wanted to leave a message for Dr. Bhagat about her symptoms as she was recently in and see if Dr. Bhagat can prescribe her some antibiotics? Ask which pharmacy she uses she states she uses [facility]. Told her will take a message and see what the doctor says and that it could take up to two business days for a response. She would like someone to give her a call back today. Please call and advise.      OK to leave message on voice mail? Yes    Primary language: English      needed? No    Call taken on June 28, 2019 at 8:28 AM by Bhaskar Carranza

## 2019-06-28 NOTE — TELEPHONE ENCOUNTER
Discussed with Dr Bhagat, would prefer to see Minnie in clinic. Ok to double book. Called Minnie, went to urgent care for further assessment, was prescribed Amoxicillin. Will follow up if unresolved. Ben PATTERSON

## 2019-06-29 NOTE — PROGRESS NOTES
Preceptor Attestation:   Patient seen, evaluated and discussed with the resident. I have verified the content of the note, which accurately reflects my assessment of the patient and the plan of care.  Supervising Physician:Sarah Lambert MD  [facility]

## 2020-03-10 ENCOUNTER — HEALTH MAINTENANCE LETTER (OUTPATIENT)
Age: 40
End: 2020-03-10

## 2020-12-27 ENCOUNTER — HEALTH MAINTENANCE LETTER (OUTPATIENT)
Age: 40
End: 2020-12-27

## 2021-04-24 ENCOUNTER — HEALTH MAINTENANCE LETTER (OUTPATIENT)
Age: 41
End: 2021-04-24

## 2021-10-09 ENCOUNTER — HEALTH MAINTENANCE LETTER (OUTPATIENT)
Age: 41
End: 2021-10-09

## 2022-05-16 ENCOUNTER — HEALTH MAINTENANCE LETTER (OUTPATIENT)
Age: 42
End: 2022-05-16

## 2022-09-11 ENCOUNTER — HEALTH MAINTENANCE LETTER (OUTPATIENT)
Age: 42
End: 2022-09-11

## 2023-06-03 ENCOUNTER — HEALTH MAINTENANCE LETTER (OUTPATIENT)
Age: 43
End: 2023-06-03

## 2024-02-24 ENCOUNTER — HEALTH MAINTENANCE LETTER (OUTPATIENT)
Age: 44
End: 2024-02-24